# Patient Record
Sex: MALE | Race: BLACK OR AFRICAN AMERICAN | Employment: FULL TIME | URBAN - METROPOLITAN AREA
[De-identification: names, ages, dates, MRNs, and addresses within clinical notes are randomized per-mention and may not be internally consistent; named-entity substitution may affect disease eponyms.]

---

## 2019-04-20 ENCOUNTER — HOSPITAL ENCOUNTER (EMERGENCY)
Age: 49
Discharge: HOME | End: 2019-04-20
Attending: EMERGENCY MEDICINE

## 2019-04-20 ENCOUNTER — APPOINTMENT (OUTPATIENT)
Dept: CT IMAGING | Age: 49
End: 2019-04-20

## 2019-04-20 ENCOUNTER — APPOINTMENT (OUTPATIENT)
Dept: GENERAL RADIOLOGY | Age: 49
End: 2019-04-20

## 2019-04-20 VITALS
SYSTOLIC BLOOD PRESSURE: 154 MMHG | TEMPERATURE: 97.5 F | DIASTOLIC BLOOD PRESSURE: 87 MMHG | HEART RATE: 88 BPM | RESPIRATION RATE: 19 BRPM | OXYGEN SATURATION: 97 %

## 2019-04-20 DIAGNOSIS — I10 HYPERTENSION, UNSPECIFIED TYPE: ICD-10-CM

## 2019-04-20 DIAGNOSIS — R56.9 SEIZURE (HCC): Primary | ICD-10-CM

## 2019-04-20 LAB
ALBUMIN SERPL-MCNC: 4.8 G/DL (ref 3.4–5)
ALP BLD-CCNC: 76 U/L (ref 40–129)
ALT SERPL-CCNC: 18 U/L (ref 10–40)
AMPHETAMINE SCREEN, URINE: ABNORMAL
ANION GAP SERPL CALCULATED.3IONS-SCNC: 28 MMOL/L (ref 3–16)
AST SERPL-CCNC: 30 U/L (ref 15–37)
BARBITURATE SCREEN URINE: ABNORMAL
BASE EXCESS VENOUS: -16 (ref -3–3)
BASE EXCESS VENOUS: -5 (ref -3–3)
BASOPHILS ABSOLUTE: 0.1 K/UL (ref 0–0.2)
BASOPHILS RELATIVE PERCENT: 0.6 %
BENZODIAZEPINE SCREEN, URINE: ABNORMAL
BILIRUB SERPL-MCNC: 0.3 MG/DL (ref 0–1)
BILIRUBIN DIRECT: <0.2 MG/DL (ref 0–0.3)
BILIRUBIN, INDIRECT: NORMAL MG/DL (ref 0–1)
BUN BLDV-MCNC: 15 MG/DL (ref 7–20)
CALCIUM SERPL-MCNC: 10 MG/DL (ref 8.3–10.6)
CANNABINOID SCREEN URINE: POSITIVE
CHLORIDE BLD-SCNC: 95 MMOL/L (ref 99–110)
CO2: 15 MMOL/L (ref 21–32)
COCAINE METABOLITE SCREEN URINE: ABNORMAL
CREAT SERPL-MCNC: 0.9 MG/DL (ref 0.9–1.3)
EOSINOPHILS ABSOLUTE: 0.2 K/UL (ref 0–0.6)
EOSINOPHILS RELATIVE PERCENT: 1.7 %
ETHANOL: 47 MG/DL (ref 0–0.08)
GFR AFRICAN AMERICAN: >60
GFR NON-AFRICAN AMERICAN: >60
GLUCOSE BLD-MCNC: 97 MG/DL (ref 70–99)
HCO3 VENOUS: 12.6 MMOL/L (ref 23–29)
HCO3 VENOUS: 20.4 MMOL/L (ref 23–29)
HCT VFR BLD CALC: 47 % (ref 40.5–52.5)
HEMOGLOBIN: 15.1 G/DL (ref 13.5–17.5)
LACTATE: 15.83 MMOL/L (ref 0.4–2)
LACTATE: 6.81 MMOL/L (ref 0.4–2)
LIPASE: 20 U/L (ref 13–60)
LYMPHOCYTES ABSOLUTE: 3.3 K/UL (ref 1–5.1)
LYMPHOCYTES RELATIVE PERCENT: 25 %
Lab: ABNORMAL
MAGNESIUM: 2.4 MG/DL (ref 1.8–2.4)
MCH RBC QN AUTO: 33.4 PG (ref 26–34)
MCHC RBC AUTO-ENTMCNC: 32.1 G/DL (ref 31–36)
MCV RBC AUTO: 104.2 FL (ref 80–100)
METHADONE SCREEN, URINE: ABNORMAL
MONOCYTES ABSOLUTE: 0.5 K/UL (ref 0–1.3)
MONOCYTES RELATIVE PERCENT: 4.1 %
NEUTROPHILS ABSOLUTE: 9 K/UL (ref 1.7–7.7)
NEUTROPHILS RELATIVE PERCENT: 68.6 %
O2 SAT, VEN: 69 %
O2 SAT, VEN: 82 %
OPIATE SCREEN URINE: ABNORMAL
OXYCODONE URINE: ABNORMAL
PCO2, VEN: 34.2 MM HG (ref 40–50)
PCO2, VEN: 35.5 MM HG (ref 40–50)
PDW BLD-RTO: 14.3 % (ref 12.4–15.4)
PERFORMED ON: ABNORMAL
PERFORMED ON: ABNORMAL
PH UA: 5
PH VENOUS: 7.18 (ref 7.35–7.45)
PH VENOUS: 7.37 (ref 7.35–7.45)
PHENCYCLIDINE SCREEN URINE: ABNORMAL
PHOSPHORUS: 2.5 MG/DL (ref 2.5–4.9)
PLATELET # BLD: 243 K/UL (ref 135–450)
PMV BLD AUTO: 8.6 FL (ref 5–10.5)
PO2, VEN: 44 MM HG
PO2, VEN: 47 MM HG
POC SAMPLE TYPE: ABNORMAL
POC SAMPLE TYPE: ABNORMAL
POTASSIUM SERPL-SCNC: 3.8 MMOL/L (ref 3.5–5.1)
PROPOXYPHENE SCREEN: ABNORMAL
RBC # BLD: 4.51 M/UL (ref 4.2–5.9)
SODIUM BLD-SCNC: 138 MMOL/L (ref 136–145)
TCO2 CALC VENOUS: 14 MMOL/L
TCO2 CALC VENOUS: 21 MMOL/L
TOTAL PROTEIN: 7.9 G/DL (ref 6.4–8.2)
TROPONIN: <0.01 NG/ML
WBC # BLD: 13.1 K/UL (ref 4–11)

## 2019-04-20 PROCEDURE — G0480 DRUG TEST DEF 1-7 CLASSES: HCPCS

## 2019-04-20 PROCEDURE — 71046 X-RAY EXAM CHEST 2 VIEWS: CPT

## 2019-04-20 PROCEDURE — 85025 COMPLETE CBC W/AUTO DIFF WBC: CPT

## 2019-04-20 PROCEDURE — 83605 ASSAY OF LACTIC ACID: CPT

## 2019-04-20 PROCEDURE — 80307 DRUG TEST PRSMV CHEM ANLYZR: CPT

## 2019-04-20 PROCEDURE — 84484 ASSAY OF TROPONIN QUANT: CPT

## 2019-04-20 PROCEDURE — 96365 THER/PROPH/DIAG IV INF INIT: CPT

## 2019-04-20 PROCEDURE — 93005 ELECTROCARDIOGRAM TRACING: CPT | Performed by: EMERGENCY MEDICINE

## 2019-04-20 PROCEDURE — 82803 BLOOD GASES ANY COMBINATION: CPT

## 2019-04-20 PROCEDURE — 99285 EMERGENCY DEPT VISIT HI MDM: CPT

## 2019-04-20 PROCEDURE — 2580000003 HC RX 258: Performed by: EMERGENCY MEDICINE

## 2019-04-20 PROCEDURE — 70450 CT HEAD/BRAIN W/O DYE: CPT

## 2019-04-20 PROCEDURE — 83735 ASSAY OF MAGNESIUM: CPT

## 2019-04-20 PROCEDURE — 84100 ASSAY OF PHOSPHORUS: CPT

## 2019-04-20 PROCEDURE — 80076 HEPATIC FUNCTION PANEL: CPT

## 2019-04-20 PROCEDURE — 6370000000 HC RX 637 (ALT 250 FOR IP): Performed by: EMERGENCY MEDICINE

## 2019-04-20 PROCEDURE — 96366 THER/PROPH/DIAG IV INF ADDON: CPT

## 2019-04-20 PROCEDURE — 80048 BASIC METABOLIC PNL TOTAL CA: CPT

## 2019-04-20 PROCEDURE — 83690 ASSAY OF LIPASE: CPT

## 2019-04-20 RX ORDER — SODIUM CHLORIDE, SODIUM LACTATE, POTASSIUM CHLORIDE, CALCIUM CHLORIDE 600; 310; 30; 20 MG/100ML; MG/100ML; MG/100ML; MG/100ML
1000 INJECTION, SOLUTION INTRAVENOUS ONCE
Status: COMPLETED | OUTPATIENT
Start: 2019-04-20 | End: 2019-04-20

## 2019-04-20 RX ORDER — LORAZEPAM 1 MG/1
1 TABLET ORAL ONCE
Status: COMPLETED | OUTPATIENT
Start: 2019-04-20 | End: 2019-04-20

## 2019-04-20 RX ORDER — LORAZEPAM 1 MG/1
1 TABLET ORAL 2 TIMES DAILY
Qty: 6 TABLET | Refills: 0 | Status: SHIPPED | OUTPATIENT
Start: 2019-04-20 | End: 2019-04-23

## 2019-04-20 RX ADMIN — SODIUM CHLORIDE, POTASSIUM CHLORIDE, SODIUM LACTATE AND CALCIUM CHLORIDE 1000 ML: 600; 310; 30; 20 INJECTION, SOLUTION INTRAVENOUS at 19:19

## 2019-04-20 RX ADMIN — LORAZEPAM 1 MG: 1 TABLET ORAL at 20:24

## 2019-04-20 NOTE — ED PROVIDER NOTES
Ethanol   Result Value Ref Range    Ethanol Lvl 47 mg/dL   Urine Drug Screen   Result Value Ref Range    Amphetamine Screen, Urine Neg Negative <1000ng/mL    Barbiturate Screen, Ur Neg Negative <200 ng/mL    Benzodiazepine Screen, Urine Neg Negative <200 ng/mL    Cannabinoid Scrn, Ur POSITIVE (A) Negative <50 ng/mL    Cocaine Metabolite Screen, Urine Neg Negative <300 ng/mL    Opiate Scrn, Ur Neg Negative <300 ng/mL    PCP Screen, Urine Neg Negative <25 ng/mL    Methadone Screen, Urine Neg Negative <300 ng/mL    Propoxyphene Scrn, Ur Neg Negative <300 ng/mL    pH, UA 5.0     Drug Screen Comment: see below     Oxycodone Urine Neg Negative <100 ng/ml   POCT Venous   Result Value Ref Range    pH, [place] 7.176 (LL) 7.350 - 7.450    pCO2, [place] 34.2 (L) 40.0 - 50.0 mm Hg    pO2, [place] 44 Not Established mm Hg    HCO3, Venous 12.6 (L) 23.0 - 29.0 mmol/L    Base Excess, [place] -16 (L) -3 - 3    O2 Sat, [place] 69 Not Established %    TC02 (Calc), [place] 14 Not Established mmol/L    Lactate 15.83 (HH) 0.40 - 2.00 mmol/L    Sample Type AKOSUA     Performed on SEE BELOW        RECENT VITALS:  BP: (!) 177/110, Temp: 97.5 °F (36.4 °C), Pulse: 122, Resp: 21, SpO2: 92 %       ED Course     Nursing Notes, Past Medical Hx, Past Surgical Hx, Social Hx, Allergies, and Family Hx were reviewed. The patient was given the following medications:  Orders Placed This Encounter   Medications    lactated ringers infusion 1,000 mL    LORazepam (ATIVAN) tablet 1 mg    LORazepam (ATIVAN) 1 MG tablet     Sig: Take 1 tablet by mouth 2 times daily for 3 days. Dispense:  6 tablet     Refill:  0       CONSULTS:  None    MEDICAL DECISION MAKING / ASSESSMENT / Savana Francisco is a 46 y.o. male pursuing to the emergency department after an apparent new onset seizure.   The patient did not bite his tongue or lose control of bowel or bladder, but has a reliable witness, who seemed to a witness, proximally 30-60 seconds of generalized tonic-clonic activity times daily for 3 days.        DISPOSITION Decision To Discharge 04/20/2019 08:18:27 PM         Lea Castañeda MD  04/20/19 6623

## 2019-04-20 NOTE — ED NOTES
Bed: A09-09  Expected date:   Expected time:   Means of arrival:   Comments:  [address] [facility], RN  04/20/19 2060

## 2019-04-21 LAB
EKG ATRIAL RATE: 93 BPM
EKG DIAGNOSIS: NORMAL
EKG P AXIS: 80 DEGREES
EKG P-R INTERVAL: 134 MS
EKG Q-T INTERVAL: 356 MS
EKG QRS DURATION: 74 MS
EKG QTC CALCULATION (BAZETT): 442 MS
EKG R AXIS: -57 DEGREES
EKG T AXIS: 80 DEGREES
EKG VENTRICULAR RATE: 93 BPM

## 2023-02-17 ENCOUNTER — HOSPITAL ENCOUNTER (INPATIENT)
Age: 53
LOS: 5 days | Discharge: HOME | DRG: 180 | End: 2023-02-22
Attending: EMERGENCY MEDICINE | Admitting: INTERNAL MEDICINE
Payer: MEDICAID

## 2023-02-17 ENCOUNTER — APPOINTMENT (OUTPATIENT)
Dept: CT IMAGING | Age: 53
DRG: 180 | End: 2023-02-17
Payer: MEDICAID

## 2023-02-17 ENCOUNTER — APPOINTMENT (OUTPATIENT)
Dept: GENERAL RADIOLOGY | Age: 53
DRG: 180 | End: 2023-02-17
Payer: MEDICAID

## 2023-02-17 DIAGNOSIS — R91.8 LUNG MASS: ICD-10-CM

## 2023-02-17 DIAGNOSIS — C34.91 MALIGNANT NEOPLASM OF RIGHT LUNG, UNSPECIFIED PART OF LUNG (HCC): Primary | ICD-10-CM

## 2023-02-17 LAB
ANION GAP SERPL CALCULATED.3IONS-SCNC: 11 MMOL/L (ref 3–16)
BASOPHILS ABSOLUTE: 0.1 K/UL (ref 0–0.2)
BASOPHILS RELATIVE PERCENT: 1.2 %
BUN BLDV-MCNC: 8 MG/DL (ref 7–20)
CALCIUM SERPL-MCNC: 9.6 MG/DL (ref 8.3–10.6)
CHLORIDE BLD-SCNC: 98 MMOL/L (ref 99–110)
CO2: 23 MMOL/L (ref 21–32)
CREAT SERPL-MCNC: 0.5 MG/DL (ref 0.9–1.3)
EOSINOPHILS ABSOLUTE: 0.1 K/UL (ref 0–0.6)
EOSINOPHILS RELATIVE PERCENT: 0.8 %
GFR SERPL CREATININE-BSD FRML MDRD: >60 ML/MIN/{1.73_M2}
GLUCOSE BLD-MCNC: 105 MG/DL
GLUCOSE BLD-MCNC: 105 MG/DL (ref 70–99)
GLUCOSE BLD-MCNC: 166 MG/DL (ref 70–99)
HCT VFR BLD CALC: 31.6 % (ref 40.5–52.5)
HEMOGLOBIN: 10.4 G/DL (ref 13.5–17.5)
LYMPHOCYTES ABSOLUTE: 1.3 K/UL (ref 1–5.1)
LYMPHOCYTES RELATIVE PERCENT: 13.6 %
MCH RBC QN AUTO: 29.1 PG (ref 26–34)
MCHC RBC AUTO-ENTMCNC: 32.9 G/DL (ref 31–36)
MCV RBC AUTO: 88.5 FL (ref 80–100)
MONOCYTES ABSOLUTE: 0.7 K/UL (ref 0–1.3)
MONOCYTES RELATIVE PERCENT: 7.6 %
NEUTROPHILS ABSOLUTE: 7.2 K/UL (ref 1.7–7.7)
NEUTROPHILS RELATIVE PERCENT: 76.8 %
PDW BLD-RTO: 15.8 % (ref 12.4–15.4)
PERFORMED ON: ABNORMAL
PLATELET # BLD: 428 K/UL (ref 135–450)
PMV BLD AUTO: 7 FL (ref 5–10.5)
POTASSIUM REFLEX MAGNESIUM: 4.2 MMOL/L (ref 3.5–5.1)
PRO-BNP: 427 PG/ML (ref 0–124)
RBC # BLD: 3.57 M/UL (ref 4.2–5.9)
SEDIMENTATION RATE, ERYTHROCYTE: 102 MM/HR (ref 0–20)
SODIUM BLD-SCNC: 132 MMOL/L (ref 136–145)
TROPONIN: <0.01 NG/ML
WBC # BLD: 9.3 K/UL (ref 4–11)

## 2023-02-17 PROCEDURE — 85025 COMPLETE CBC W/AUTO DIFF WBC: CPT

## 2023-02-17 PROCEDURE — 6360000004 HC RX CONTRAST MEDICATION: Performed by: EMERGENCY MEDICINE

## 2023-02-17 PROCEDURE — 85652 RBC SED RATE AUTOMATED: CPT

## 2023-02-17 PROCEDURE — 74177 CT ABD & PELVIS W/CONTRAST: CPT

## 2023-02-17 PROCEDURE — 71260 CT THORAX DX C+: CPT

## 2023-02-17 PROCEDURE — 96360 HYDRATION IV INFUSION INIT: CPT

## 2023-02-17 PROCEDURE — 80048 BASIC METABOLIC PNL TOTAL CA: CPT

## 2023-02-17 PROCEDURE — 71046 X-RAY EXAM CHEST 2 VIEWS: CPT

## 2023-02-17 PROCEDURE — 84484 ASSAY OF TROPONIN QUANT: CPT

## 2023-02-17 PROCEDURE — 83880 ASSAY OF NATRIURETIC PEPTIDE: CPT

## 2023-02-17 PROCEDURE — 99285 EMERGENCY DEPT VISIT HI MDM: CPT

## 2023-02-17 PROCEDURE — 86140 C-REACTIVE PROTEIN: CPT

## 2023-02-17 PROCEDURE — 2060000000 HC ICU INTERMEDIATE R&B

## 2023-02-17 PROCEDURE — 93005 ELECTROCARDIOGRAM TRACING: CPT

## 2023-02-17 PROCEDURE — 2580000003 HC RX 258

## 2023-02-17 RX ORDER — SODIUM CHLORIDE 9 MG/ML
INJECTION, SOLUTION INTRAVENOUS PRN
Status: DISCONTINUED | OUTPATIENT
Start: 2023-02-17 | End: 2023-02-22 | Stop reason: HOSPADM

## 2023-02-17 RX ORDER — SODIUM CHLORIDE 0.9 % (FLUSH) 0.9 %
5-40 SYRINGE (ML) INJECTION PRN
Status: DISCONTINUED | OUTPATIENT
Start: 2023-02-17 | End: 2023-02-22 | Stop reason: HOSPADM

## 2023-02-17 RX ORDER — ONDANSETRON 4 MG/1
4 TABLET, ORALLY DISINTEGRATING ORAL EVERY 8 HOURS PRN
Status: DISCONTINUED | OUTPATIENT
Start: 2023-02-17 | End: 2023-02-22 | Stop reason: HOSPADM

## 2023-02-17 RX ORDER — GAUZE BANDAGE 2" X 2"
100 BANDAGE TOPICAL DAILY
Status: DISPENSED | OUTPATIENT
Start: 2023-02-18 | End: 2023-02-21

## 2023-02-17 RX ORDER — HYDRALAZINE HYDROCHLORIDE 20 MG/ML
5 INJECTION INTRAMUSCULAR; INTRAVENOUS EVERY 4 HOURS PRN
Status: DISCONTINUED | OUTPATIENT
Start: 2023-02-17 | End: 2023-02-22 | Stop reason: HOSPADM

## 2023-02-17 RX ORDER — NICOTINE 21 MG/24HR
1 PATCH, TRANSDERMAL 24 HOURS TRANSDERMAL DAILY
Status: DISCONTINUED | OUTPATIENT
Start: 2023-02-18 | End: 2023-02-22 | Stop reason: HOSPADM

## 2023-02-17 RX ORDER — ACETAMINOPHEN 650 MG/1
650 SUPPOSITORY RECTAL EVERY 6 HOURS PRN
Status: DISCONTINUED | OUTPATIENT
Start: 2023-02-17 | End: 2023-02-22 | Stop reason: HOSPADM

## 2023-02-17 RX ORDER — POLYETHYLENE GLYCOL 3350 17 G/17G
17 POWDER, FOR SOLUTION ORAL DAILY PRN
Status: DISCONTINUED | OUTPATIENT
Start: 2023-02-17 | End: 2023-02-22 | Stop reason: HOSPADM

## 2023-02-17 RX ORDER — SODIUM CHLORIDE 9 MG/ML
1000 INJECTION, SOLUTION INTRAVENOUS CONTINUOUS
Status: DISCONTINUED | OUTPATIENT
Start: 2023-02-17 | End: 2023-02-18

## 2023-02-17 RX ORDER — ONDANSETRON 2 MG/ML
4 INJECTION INTRAMUSCULAR; INTRAVENOUS EVERY 6 HOURS PRN
Status: DISCONTINUED | OUTPATIENT
Start: 2023-02-17 | End: 2023-02-22 | Stop reason: HOSPADM

## 2023-02-17 RX ORDER — SODIUM CHLORIDE 0.9 % (FLUSH) 0.9 %
5-40 SYRINGE (ML) INJECTION EVERY 12 HOURS SCHEDULED
Status: DISCONTINUED | OUTPATIENT
Start: 2023-02-18 | End: 2023-02-22 | Stop reason: HOSPADM

## 2023-02-17 RX ORDER — ACETAMINOPHEN 325 MG/1
650 TABLET ORAL EVERY 6 HOURS PRN
Status: DISCONTINUED | OUTPATIENT
Start: 2023-02-17 | End: 2023-02-22 | Stop reason: HOSPADM

## 2023-02-17 RX ORDER — LABETALOL HYDROCHLORIDE 5 MG/ML
5 INJECTION, SOLUTION INTRAVENOUS EVERY 4 HOURS PRN
Status: DISCONTINUED | OUTPATIENT
Start: 2023-02-17 | End: 2023-02-22 | Stop reason: HOSPADM

## 2023-02-17 RX ORDER — 0.9 % SODIUM CHLORIDE 0.9 %
500 INTRAVENOUS SOLUTION INTRAVENOUS ONCE
Status: COMPLETED | OUTPATIENT
Start: 2023-02-17 | End: 2023-02-17

## 2023-02-17 RX ORDER — ENOXAPARIN SODIUM 100 MG/ML
40 INJECTION SUBCUTANEOUS NIGHTLY
Status: DISCONTINUED | OUTPATIENT
Start: 2023-02-18 | End: 2023-02-18

## 2023-02-17 RX ORDER — GLUCAGON 1 MG/ML
1 KIT INJECTION PRN
Status: DISCONTINUED | OUTPATIENT
Start: 2023-02-17 | End: 2023-02-22 | Stop reason: HOSPADM

## 2023-02-17 RX ORDER — DEXTROSE MONOHYDRATE 100 MG/ML
INJECTION, SOLUTION INTRAVENOUS CONTINUOUS PRN
Status: DISCONTINUED | OUTPATIENT
Start: 2023-02-17 | End: 2023-02-22 | Stop reason: HOSPADM

## 2023-02-17 RX ADMIN — SODIUM CHLORIDE 500 ML: 9 INJECTION, SOLUTION INTRAVENOUS at 20:05

## 2023-02-17 RX ADMIN — IOPAMIDOL 75 ML: 755 INJECTION, SOLUTION INTRAVENOUS at 19:35

## 2023-02-17 RX ADMIN — SODIUM CHLORIDE 1000 ML: 9 INJECTION, SOLUTION INTRAVENOUS at 20:05

## 2023-02-17 ASSESSMENT — ENCOUNTER SYMPTOMS
EYE ITCHING: 0
TROUBLE SWALLOWING: 1
BLOOD IN STOOL: 0
CHOKING: 0
VOMITING: 0
ANAL BLEEDING: 0
APNEA: 0
DIARRHEA: 0
NAUSEA: 0
CHEST TIGHTNESS: 0
ABDOMINAL DISTENTION: 0
EYE PAIN: 0
BACK PAIN: 0
COLOR CHANGE: 0
ABDOMINAL PAIN: 0
EYE DISCHARGE: 0
COUGH: 0
SHORTNESS OF BREATH: 0
CONSTIPATION: 0

## 2023-02-17 ASSESSMENT — PAIN DESCRIPTION - LOCATION: LOCATION: LEG

## 2023-02-17 ASSESSMENT — PAIN DESCRIPTION - ORIENTATION: ORIENTATION: RIGHT;LEFT

## 2023-02-17 ASSESSMENT — PAIN SCALES - GENERAL: PAINLEVEL_OUTOF10: 3

## 2023-02-17 ASSESSMENT — PAIN DESCRIPTION - DESCRIPTORS: DESCRIPTORS: ACHING;DULL

## 2023-02-17 ASSESSMENT — PAIN DESCRIPTION - FREQUENCY: FREQUENCY: CONTINUOUS

## 2023-02-17 ASSESSMENT — PAIN DESCRIPTION - PAIN TYPE: TYPE: ACUTE PAIN

## 2023-02-17 NOTE — ED PROVIDER NOTES
ED Attending Attestation Note     Date of evaluation: 2/17/2023    This patient was seen by the resident. I have seen and examined the patient, agree with the workup, evaluation, management and diagnosis. The care plan has been discussed. I have reviewed the ECG and concur with the resident's interpretation. My assessment reveals a 49-year-old male who presents with a chief complaint of leg swelling. Patient with leg swelling times a few weeks. Denies shortness of breath. Long smoking history. Cachectic, does have some expiratory wheezing on exam.  Bilateral lower extremity edema. Ova MD Edwar  02/17/23 1950

## 2023-02-17 NOTE — ED PROVIDER NOTES
[address] [facility] RESIDENT NOTE       Date of evaluation: 2/17/2023    Chief Complaint     Leg Swelling    History of Present Illness     Bassam Cervantes is a 45 y.o. male with chronic tobacco use chronic alcohol use and not on any medications who presented with complaints of swollen joints. Patient endorses that since about early Dec 2022, he noticed that he had swelling of knees down to distal part of legs. He endorses that at the time he obtained over-the-counter diuretics which helped improve his leg swelling within 2 weeks. He endorses that in mid January he noticed that his knees had again swollen up however this time he also noticed that there was swelling in his bilateral wrists, hands, elbows. He tried over-the-counter diuretics again however with little relief. He comes into the ED today because of worsening swelling, he is using a wheelchair today as he is not able to walk. Patient endorses night sweats which started at the beginning of the year, he also endorses weight loss of 5 to 7 pounds within the same timeframe, endorsing that he has not had as strong as an appetite as he used to. He does note some new \"bumps and lumps\" behind his throat which he says he can feel while swallowing. He endorses he has been extremely fatigued and unable to complete daily activities of living. He denies any specific fevers or chills, nausea or vomiting, abdominal pain, acute urinary or bowel abnormalities. He denies chest pain, SOB, new rashes or bruising. He is a chronic smoker, smoked 1.5 packs/year since the age of 13. He he also has significant alcohol use history. Per his sister patient's drink of choice is beer, and on weekends he drinks beer until he \"passes out\". He works as a . He recently lost his job and was attempting to apply for a new one.   His family history is significant for brain cancer in paternal aunt, high blood pressure and heart disease in siblings. He does have a daughter whom he does not actively speak to, as well as grandchildren that he is not in contact with. Of note he has minimal long-term follow-up based on the chart. He was seen in our ED in 2019 for seizures (questionably alcohol withdrawal related) and high blood pressure. He was given Ativan and referred to Neurology, PCP for further evaluation on discharge from ED. There are no documented visits to Neurology in our system or [facility]where. The last time patient saw PCP was documented to be in 2014, he was diagnosed with hypertension and placed on Lis-HCTZ which he does not currently take. In the ED, vitals significant for high blood pressure to 332 systolic. He was tachycardic to 101. Other vital signs stable. Labs show hyponatremia to 132, chloride 98. CBC shows stable anemia, hemoglobin 10.4. Sed rate 102. CXR done in the ED shows large right upper lobe mass likely representing primary malignancy. CTAP with evidence  of large right upper lobe and right hilar mass consistent with primary lung cancer with possible metastatic disease in chest. Also mild subcarinal and pretracheal lymphadenopathy may represent metastatic disease. MEDICAL DECISION MAKING / ASSESSMENT / PLAN     INITIAL VITALS: BP: (!) 145/87, Temp: 98.3 °F (36.8 °C), Heart Rate: (!) 101, Resp: 18, SpO2: 99 %    Brigido Bach is a 45 y.o. male with past medical history of seizures, chronic tobacco and alcohol use who presented with complaints of joint swelling over the past few weeks that acutely worsened. Vital signs significant for mild hypertension, patient was seen in the past for high blood pressure but has been off medication. Labs show mild hyponatremia, chronic anemia and inflammatory markers are elevated. CXR revealing of mass concerning for malignancy. With patient's smoking history, this is concerning for primary lung cancer. CT abdomen pelvis was performed.   CT of the chest did not show any acute PE but did show evidence of large right upper lobe and right hilar mass consistent with primary lung cancer. The right upper lobe mass does encase right upper lobe pulmonary artery branches and bronchioles. 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 Decision was made to admit the patient to the floor for further work-up and management. Medical Decision Making  Amount and/or Complexity of Data Reviewed  Labs: ordered. Decision-making details documented in ED Course. Radiology: ordered. Decision-making details documented in ED Course. ECG/medicine tests: ordered. Risk  Prescription drug management. Decision regarding hospitalization. This patient was also evaluated by the attending physician. All care plans werediscussed and agreed upon. Clinical Impression     No diagnosis found. Disposition     PATIENT REFERRED TO:  No follow-up provider specified. DISCHARGE MEDICATIONS:  New Prescriptions    No medications on file     DISPOSITION Admitted 02/17/2023 09:56:50 PM    Diagnostic Results and Other Data     RADIOLOGY:  CT CHEST PULMONARY EMBOLISM W CONTRAST   Final Result      Chest:   1. Large right upper lobe and right hilar mass consistent with primary lung cancer. 2.  Mild subcarinal and pretracheal lymphadenopathy may represent metastatic disease. 3.  Severe emphysema. 4.  No evidence of pulmonary embolism. Right upper lobe mass encases and narrows right upper lobe pulmonary artery branches as well as bronchioles. Abdomen and pelvis:   1. Small indeterminate left adrenal nodule. 2.  Otherwise, no evidence of metastatic disease in the abdomen and pelvis. 3.  Note that the study is limited due to paucity of intra-abdominal fat/cachexia. CT ABDOMEN PELVIS W IV CONTRAST Additional Contrast? Oral   Final Result      Chest:   1.   Large right upper lobe and right hilar mass consistent with primary lung cancer. 2.  Mild subcarinal and pretracheal lymphadenopathy may represent metastatic disease. 3.  Severe emphysema. 4.  No evidence of pulmonary embolism. Right upper lobe mass encases and narrows right upper lobe pulmonary artery branches as well as bronchioles. Abdomen and pelvis:   1. Small indeterminate left adrenal nodule. 2.  Otherwise, no evidence of metastatic disease in the abdomen and pelvis. 3.  Note that the study is limited due to paucity of intra-abdominal fat/cachexia. XR CHEST (2 VW)   Final Result      Large right upper lobe mass likely representing primary malignancy. Recommend CT chest or chest/abdomen/pelvis with contrast for further evaluation.            LABS:   Results for orders placed or performed during the hospital encounter of 02/17/23   BMP w/ Reflex to MG   Result Value Ref Range    Sodium 132 (L) 136 - 145 mmol/L    Potassium reflex Magnesium 4.2 3.5 - 5.1 mmol/L    Chloride 98 (L) 99 - 110 mmol/L    CO2 23 21 - 32 mmol/L    Anion Gap 11 3 - 16    Glucose 166 (H) 70 - 99 mg/dL    BUN 8 7 - 20 mg/dL    Creatinine 0.5 (L) 0.9 - 1.3 mg/dL    Est, Glom Filt Rate >60 >60    Calcium 9.6 8.3 - 10.6 mg/dL   CBC with Auto Differential   Result Value Ref Range    WBC 9.3 4.0 - 11.0 K/uL    RBC 3.57 (L) 4.20 - 5.90 M/uL    Hemoglobin 10.4 (L) 13.5 - 17.5 g/dL    Hematocrit 31.6 (L) 40.5 - 52.5 %    MCV 88.5 80.0 - 100.0 fL    MCH 29.1 26.0 - 34.0 pg    MCHC 32.9 31.0 - 36.0 g/dL    RDW 15.8 (H) 12.4 - 15.4 %    Platelets 081 408 - 611 K/uL    MPV 7.0 5.0 - 10.5 fL    Neutrophils % 76.8 %    Lymphocytes % 13.6 %    Monocytes % 7.6 %    Eosinophils % 0.8 %    Basophils % 1.2 %    Neutrophils Absolute 7.2 1.7 - 7.7 K/uL    Lymphocytes Absolute 1.3 1.0 - 5.1 K/uL    Monocytes Absolute 0.7 0.0 - 1.3 K/uL    Eosinophils Absolute 0.1 0.0 - 0.6 K/uL    Basophils Absolute 0.1 0.0 - 0.2 K/uL   Troponin   Result Value Ref Range    Troponin <0.01 <0.01 ng/mL   Brain Natriuretic Peptide   Result Value Ref Range    Pro- (H) 0 - 124 pg/mL   Sedimentation Rate   Result Value Ref Range    Sed Rate 102 (H) 0 - 20 mm/Hr     EKG   Interpreted in conjunction with emergencydepartment physician John Paul Valdivia MD.    ED BEDSIDE ULTRASOUND:  No results found. RECENT VITALS:  BP: (!) 157/92, Temp: 98.3 °F (36.8 °C), Heart Rate: (!) 101,Resp: 18, SpO2: 100 %     Procedures     None. ED Course     Nursing Notes, Past Medical Hx, Past Surgical Hx, Social Hx, Allergies, and Family Hx were reviewed. ED Course as of 02/17/23 2157 Fri Feb 17, 2023 1901 Patient seen and examined. New b/l knee and leg swelling, b/l hand, wrist and elbow swelling. +Night sweats, weight loss, ? LAD. [SV]   1902 Ordered basic labs, ESR/CRP, CTPA, CT abdomen & pelvis. [SV]   1910 BMP with hyponatremia to 132 and Cl 98. CBC largely unremarkable with exception of anemia to 10 which appears chronic. [SV]   1923 Sed Rate(!): 102 [SV]   2120 CT CHEST PULMONARY EMBOLISM W CONTRAST [SV]      ED Course User Index  [SV] Ximena Torres MD     The patient was given the following medications:  Orders Placed This Encounter   Medications    iopamidol (ISOVUE-370) 76 % injection 75 mL    0.9 % sodium chloride infusion    0.9 % sodium chloride bolus     CONSULTS:  IP CONSULT TO HOSPITALIST    Review of Systems     Review of Systems   Constitutional:  Positive for appetite change and unexpected weight change (Pt states he has lost 5-7 lbs since Dec 22.). + Night sweats. Cardiovascular:  Positive for leg swelling. Negative for chest pain and palpitations. Gastrointestinal:  Negative for abdominal pain, constipation, diarrhea, nausea and vomiting. Genitourinary:  Negative for dysuria. Musculoskeletal:  Positive for arthralgias, joint swelling and myalgias. Past Medical, Surgical, Family, and Social History     He has a past medical history of Hypertension.   He has no past surgical history on file. His family history is not on file. He reports that he has been smoking cigarettes. He has been smoking an average of .5 packs per day. He has never used smokeless tobacco. He reports current alcohol use. He reports that he does not use drugs. Medications     Previous Medications    No medications on file     Allergies     He has No Known Allergies. Physical Exam     INITIAL VITALS: BP: (!) 145/87, Temp: 98.3 °F (36.8 °C), Heart Rate: (!) 101, Resp: 18, SpO2: 99 %     Physical Exam  Constitutional:       Comments: Cachectic appearance. HENT:      Head: Normocephalic and atraumatic. Nose: Nose normal.      Mouth/Throat:      Mouth: Mucous membranes are dry. Pharynx: Oropharynx is clear. Eyes:      Extraocular Movements: Extraocular movements intact. Conjunctiva/sclera: Conjunctivae normal.   Cardiovascular:      Rate and Rhythm: Regular rhythm. Tachycardia present. Pulses: Normal pulses. Heart sounds: Normal heart sounds. Pulmonary:      Effort: Pulmonary effort is normal.      Breath sounds: No wheezing or rales. Comments: Diminished breath sounds throughout, UL more diminished than LL. Abdominal:      General: Bowel sounds are normal.      Palpations: Abdomen is soft. Musculoskeletal:         General: Swelling and tenderness present. Cervical back: Normal range of motion and neck supple. Comments: Swelling and warmth noted in b/l elbow, wrist and finger joints. Swelling and warmth in b/l knee joints. Limited ROM 2/2 pain and swelling. Trace edema in b/l LE. Skin:     General: Skin is warm and dry. Capillary Refill: Capillary refill takes less than 2 seconds. Neurological:      General: No focal deficit present. Mental Status: He is alert and oriented to person, place, and time. Mental status is at baseline. Psychiatric:         Mood and Affect: Mood normal.         Behavior: Behavior normal.         Thought Content:  Thought content normal.         Judgment: Judgment normal.       Lidia Contreras MD  Resident  02/17/23 2147       Lidia Contreras MD  Resident  02/17/23 2150 Filt Rate >60 >60    Calcium 9.3 8.3 - 10.6 mg/dL    Phosphorus 2.9 2.5 - 4.9 mg/dL    Albumin 2.6 (L) 3.4 - 5.0 g/dL   CBC with Auto Differential   Result Value Ref Range    WBC 9.8 4.0 - 11.0 K/uL    RBC 3.35 (L) 4.20 - 5.90 M/uL    Hemoglobin 9.4 (L) 13.5 - 17.5 g/dL    Hematocrit 28.8 (L) 40.5 - 52.5 %    MCV 86.1 80.0 - 100.0 fL    MCH 28.2 26.0 - 34.0 pg    MCHC 32.7 31.0 - 36.0 g/dL    RDW 15.7 (H) 12.4 - 15.4 %    Platelets 898 420 - 762 K/uL    MPV 7.2 5.0 - 10.5 fL    Neutrophils % 72.1 %    Lymphocytes % 17.2 %    Monocytes % 9.5 %    Eosinophils % 0.8 %    Basophils % 0.4 %    Neutrophils Absolute 7.1 1.7 - 7.7 K/uL    Lymphocytes Absolute 1.7 1.0 - 5.1 K/uL    Monocytes Absolute 0.9 0.0 - 1.3 K/uL    Eosinophils Absolute 0.1 0.0 - 0.6 K/uL    Basophils Absolute 0.0 0.0 - 0.2 K/uL   Magnesium   Result Value Ref Range    Magnesium 1.90 1.80 - 2.40 mg/dL   Renal Function Panel   Result Value Ref Range    Sodium 137 136 - 145 mmol/L    Potassium 4.1 3.5 - 5.1 mmol/L    Chloride 102 99 - 110 mmol/L    CO2 28 21 - 32 mmol/L    Anion Gap 7 3 - 16    Glucose 135 (H) 70 - 99 mg/dL    BUN 10 7 - 20 mg/dL    Creatinine 0.5 (L) 0.9 - 1.3 mg/dL    Est, Glom Filt Rate >60 >60    Calcium 9.5 8.3 - 10.6 mg/dL    Phosphorus 2.8 2.5 - 4.9 mg/dL    Albumin 2.8 (L) 3.4 - 5.0 g/dL   POCT glucose   Result Value Ref Range    Glucose 105 mg/dL   POCT Glucose   Result Value Ref Range    POC Glucose 105 (H) 70 - 99 mg/dl    Performed on ACCU-CHEK    POCT Glucose   Result Value Ref Range    POC Glucose 160 (H) 70 - 99 mg/dl    Performed on ACCU-CHEK    POCT Glucose   Result Value Ref Range    POC Glucose 95 70 - 99 mg/dl    Performed on ACCU-CPM [facility]    POCT Glucose   Result Value Ref Range    POC Glucose 99 70 - 99 mg/dl    Performed on ACCU-CPM [facility]xisK    POCT Glucose   Result Value Ref Range    POC Glucose 102 (H) 70 - 99 mg/dl    Performed on ACCU-CHEK    EKG 12 Lead   Result Value Ref Range    Ventricular Rate 94 BPM    Atrial Rate 94 BPM    P-R Interval 140 ms    QRS Duration 78 ms    Q-T Interval 348 ms    QTc Calculation (Bazett) 435 ms    P Axis 80 degrees    R Axis 79 degrees    T Axis 87 degrees    Diagnosis       EKG performed in ER and to be interpreted by ER physician. Confirmed by MD, ER ([id number]),  Louie Bess ([phone]) on 2/18/2023 7:15:43 AM   Echo Complete   Result Value Ref Range    Left Ventricular Ejection Fraction 58     LVEF MODALITY ECHO      EKG   Interpreted in conjunction with emergencydepartment physician Alexandrea Powell MD.    ED BEDSIDE ULTRASOUND:  No results found. RECENT VITALS:  BP: (!) 145/76, Temp: 98.4 °F (36.9 °C), Heart Rate: (!) 105,Resp: 20, SpO2: 99 %     Procedures     None. ED Course     Nursing Notes, Past Medical Hx, Past Surgical Hx, Social Hx, Allergies, and Family Hx were reviewed. ED Course as of 02/27/23 0122   Fri Feb 17, 2023 1901 Patient seen and examined. New b/l knee and leg swelling, b/l hand, wrist and elbow swelling. +Night sweats, weight loss, ? LAD. [SV]   1902 Ordered basic labs, ESR/CRP, CTPA, CT abdomen & pelvis. [SV]   1910 BMP with hyponatremia to 132 and Cl 98. CBC largely unremarkable with exception of anemia to 10 which appears chronic.   [SV]   1923 Sed Rate(!): 102 [SV]   2120 CT CHEST PULMONARY EMBOLISM W CONTRAST [SV]      ED Course User Index  [SV] Bianca Perez MD     The patient was given the following medications:  Orders Placed This Encounter   Medications    iopamidol (ISOVUE-370) 76 % injection 75 mL    DISCONTD: 0.9 % sodium chloride infusion    0.9 % sodium chloride bolus    DISCONTD: sodium chloride flush 0.9 % injection 5-40 mL    DISCONTD: sodium chloride flush 0.9 % injection 5-40 mL    DISCONTD: 0.9 % sodium chloride infusion    DISCONTD: enoxaparin (LOVENOX) injection 40 mg     Order Specific Question:   Indication of Use     Answer:   Prophylaxis-DVT/PE    DISCONTD: ondansetron (ZOFRAN-ODT) disintegrating tablet 4 mg    DISCONTD: ondansetron (Faisal Cuna) injection 4 mg    DISCONTD: polyethylene glycol (GLYCOLAX) packet 17 g    DISCONTD: acetaminophen (TYLENOL) tablet 650 mg    DISCONTD: acetaminophen (TYLENOL) suppository 650 mg    DISCONTD: sodium chloride flush 0.9 % injection 5-40 mL    DISCONTD: sodium chloride flush 0.9 % injection 5-40 mL    DISCONTD: 0.9 % sodium chloride infusion    thiamine mononitrate tablet 100 mg    DISCONTD: nicotine (NICODERM CQ) 14 MG/24HR 1 patch    DISCONTD: glucose chewable tablet 16 g    DISCONTD: dextrose bolus 10% 125 mL    DISCONTD: dextrose bolus 10% 250 mL    DISCONTD: glucagon injection 1 mg    DISCONTD: dextrose 10 % infusion    DISCONTD: labetalol (NORMODYNE;TRANDATE) injection 5 mg    DISCONTD: hydrALAZINE (APRESOLINE) injection 5 mg    DISCONTD: heparin (porcine) injection 5,000 Units    DISCONTD: amLODIPine (NORVASC) tablet 5 mg    DISCONTD: perflutren lipid microspheres (DEFINITY) injection 1.5 mL    DISCONTD: ibuprofen (ADVIL;MOTRIN) tablet 400 mg    DISCONTD: enoxaparin (LOVENOX) injection 40 mg     Order Specific Question:   Indication of Use     Answer:   Prophylaxis-DVT/PE    gadoteridol (PROHANCE) injection 12 mL    DISCONTD: LORazepam (ATIVAN) tablet 1 mg    DISCONTD: LORazepam (ATIVAN) injection 1 mg    DISCONTD: LORazepam (ATIVAN) tablet 2 mg    DISCONTD: LORazepam (ATIVAN) injection 2 mg    DISCONTD: LORazepam (ATIVAN) tablet 3 mg    DISCONTD: LORazepam (ATIVAN) injection 3 mg    DISCONTD: LORazepam (ATIVAN) tablet 4 mg    DISCONTD: LORazepam (ATIVAN) injection 4 mg    0.9 % sodium chloride infusion     For endoscopy    DISCONTD: sodium chloride flush 0.9 % injection 5-40 mL    DISCONTD: sodium chloride flush 0.9 % injection 5-40 mL    DISCONTD: 0.9 % sodium chloride infusion    DISCONTD: HYDROmorphone (DILAUDID) injection 0.25 mg    amLODIPine (NORVASC) 5 MG tablet     Sig: Take 1 tablet by mouth daily     Dispense:  30 tablet     Refill:  3     CONSULTS:  IP CONSULT TO HOSPITALIST  IP CONSULT TO PULMONOLOGY  IP CONSULT TO DIETITIAN  IP CONSULT TO SOCIAL WORK  IP CONSULT TO ONCOLOGY    Review of Systems     Review of Systems   Constitutional:  Positive for appetite change and unexpected weight change (Pt states he has lost 5-7 lbs since Dec 22.). + Night sweats. Cardiovascular:  Positive for leg swelling. Negative for chest pain and palpitations. Gastrointestinal:  Negative for abdominal pain, constipation, diarrhea, nausea and vomiting. Genitourinary:  Negative for dysuria. Musculoskeletal:  Positive for arthralgias, joint swelling and myalgias. Past Medical, Surgical, Family, and Social History     He has a past medical history of Hypertension. He has a past surgical history that includes bronchoscopy (N/A, 2/21/2023); bronchoscopy (N/A, 2/21/2023); and bronchoscopy (2/21/2023). His family history is not on file. He reports that he has been smoking cigarettes. He has been smoking an average of .5 packs per day. He has never used smokeless tobacco. He reports current alcohol use. He reports that he does not use drugs. Medications     Discharge Medication List as of 2/22/2023  2:37 PM        Allergies     He has No Known Allergies. Physical Exam     INITIAL VITALS: BP: (!) 145/87, Temp: 98.3 °F (36.8 °C), Heart Rate: (!) 101, Resp: 18, SpO2: 99 %     Physical Exam  Constitutional:       Comments: Cachectic appearance. HENT:      Head: Normocephalic and atraumatic. Nose: Nose normal.      Mouth/Throat:      Mouth: Mucous membranes are dry. Pharynx: Oropharynx is clear. Eyes:      Extraocular Movements: Extraocular movements intact. Conjunctiva/sclera: Conjunctivae normal.   Cardiovascular:      Rate and Rhythm: Regular rhythm. Tachycardia present. Pulses: Normal pulses. Heart sounds: Normal heart sounds. Pulmonary:      Effort: Pulmonary effort is normal.      Breath sounds: No wheezing or rales.       Comments: Diminished breath sounds throughout, UL more diminished than LL. Abdominal:      General: Bowel sounds are normal.      Palpations: Abdomen is soft. Musculoskeletal:         General: Swelling and tenderness present. Cervical back: Normal range of motion and neck supple. Comments: Swelling and warmth noted in b/l elbow, wrist and finger joints. Swelling and warmth in b/l knee joints. Limited ROM 2/2 pain and swelling. Trace edema in b/l LE. Skin:     General: Skin is warm and dry. Capillary Refill: Capillary refill takes less than 2 seconds. Neurological:      General: No focal deficit present. Mental Status: He is alert and oriented to person, place, and time. Mental status is at baseline. Psychiatric:         Mood and Affect: Mood normal.         Behavior: Behavior normal.         Thought Content:  Thought content normal.         Judgment: Judgment normal.       Joel Michael MD  Resident  02/17/23 5320       Joel Michael MD  Resident  02/17/23 4829       Joel Michael MD  Resident  02/27/23 0538

## 2023-02-18 LAB
ALBUMIN SERPL-MCNC: 2.7 G/DL (ref 3.4–5)
ALP BLD-CCNC: 155 U/L (ref 40–129)
ALT SERPL-CCNC: 30 U/L (ref 10–40)
AST SERPL-CCNC: 28 U/L (ref 15–37)
BILIRUB SERPL-MCNC: 0.3 MG/DL (ref 0–1)
BILIRUBIN DIRECT: <0.2 MG/DL (ref 0–0.3)
BILIRUBIN, INDIRECT: ABNORMAL MG/DL (ref 0–1)
C-REACTIVE PROTEIN: 80.6 MG/L (ref 0–5.1)
EKG ATRIAL RATE: 94 BPM
EKG DIAGNOSIS: NORMAL
EKG P AXIS: 80 DEGREES
EKG P-R INTERVAL: 140 MS
EKG Q-T INTERVAL: 348 MS
EKG QRS DURATION: 78 MS
EKG QTC CALCULATION (BAZETT): 435 MS
EKG R AXIS: 79 DEGREES
EKG T AXIS: 87 DEGREES
EKG VENTRICULAR RATE: 94 BPM
ETHANOL: NORMAL MG/DL (ref 0–0.08)
FERRITIN: 251.7 NG/ML (ref 30–400)
GLUCOSE BLD-MCNC: 160 MG/DL (ref 70–99)
INFLUENZA A: NOT DETECTED
INFLUENZA B: NOT DETECTED
PERFORMED ON: ABNORMAL
SARS-COV-2 RNA, RT PCR: NOT DETECTED
TOTAL PROTEIN: 6.3 G/DL (ref 6.4–8.2)

## 2023-02-18 PROCEDURE — 80076 HEPATIC FUNCTION PANEL: CPT

## 2023-02-18 PROCEDURE — 2580000003 HC RX 258: Performed by: STUDENT IN AN ORGANIZED HEALTH CARE EDUCATION/TRAINING PROGRAM

## 2023-02-18 PROCEDURE — 87636 SARSCOV2 & INF A&B AMP PRB: CPT

## 2023-02-18 PROCEDURE — 6370000000 HC RX 637 (ALT 250 FOR IP): Performed by: STUDENT IN AN ORGANIZED HEALTH CARE EDUCATION/TRAINING PROGRAM

## 2023-02-18 PROCEDURE — 99223 1ST HOSP IP/OBS HIGH 75: CPT | Performed by: INTERNAL MEDICINE

## 2023-02-18 PROCEDURE — 82746 ASSAY OF FOLIC ACID SERUM: CPT

## 2023-02-18 PROCEDURE — 2060000000 HC ICU INTERMEDIATE R&B

## 2023-02-18 PROCEDURE — 94761 N-INVAS EAR/PLS OXIMETRY MLT: CPT

## 2023-02-18 PROCEDURE — 83540 ASSAY OF IRON: CPT

## 2023-02-18 PROCEDURE — 82077 ASSAY SPEC XCP UR&BREATH IA: CPT

## 2023-02-18 PROCEDURE — 82607 VITAMIN B-12: CPT

## 2023-02-18 PROCEDURE — 2580000003 HC RX 258

## 2023-02-18 PROCEDURE — 83550 IRON BINDING TEST: CPT

## 2023-02-18 PROCEDURE — 6360000002 HC RX W HCPCS: Performed by: STUDENT IN AN ORGANIZED HEALTH CARE EDUCATION/TRAINING PROGRAM

## 2023-02-18 PROCEDURE — 36415 COLL VENOUS BLD VENIPUNCTURE: CPT

## 2023-02-18 PROCEDURE — 82728 ASSAY OF FERRITIN: CPT

## 2023-02-18 PROCEDURE — 84443 ASSAY THYROID STIM HORMONE: CPT

## 2023-02-18 RX ORDER — AMLODIPINE BESYLATE 5 MG/1
5 TABLET ORAL DAILY
Status: DISCONTINUED | OUTPATIENT
Start: 2023-02-18 | End: 2023-02-22 | Stop reason: HOSPADM

## 2023-02-18 RX ORDER — IBUPROFEN 200 MG
400 TABLET ORAL EVERY 8 HOURS PRN
Status: DISCONTINUED | OUTPATIENT
Start: 2023-02-18 | End: 2023-02-22 | Stop reason: HOSPADM

## 2023-02-18 RX ORDER — HEPARIN SODIUM 5000 [USP'U]/ML
5000 INJECTION, SOLUTION INTRAVENOUS; SUBCUTANEOUS EVERY 8 HOURS SCHEDULED
Status: DISCONTINUED | OUTPATIENT
Start: 2023-02-18 | End: 2023-02-19

## 2023-02-18 RX ADMIN — ENOXAPARIN SODIUM 40 MG: 100 INJECTION SUBCUTANEOUS at 02:12

## 2023-02-18 RX ADMIN — AMLODIPINE BESYLATE 5 MG: 5 TABLET ORAL at 17:26

## 2023-02-18 RX ADMIN — SODIUM CHLORIDE 1000 ML: 9 INJECTION, SOLUTION INTRAVENOUS at 05:29

## 2023-02-18 RX ADMIN — SODIUM CHLORIDE, PRESERVATIVE FREE 10 ML: 5 INJECTION INTRAVENOUS at 12:48

## 2023-02-18 RX ADMIN — SODIUM CHLORIDE, PRESERVATIVE FREE 10 ML: 5 INJECTION INTRAVENOUS at 20:56

## 2023-02-18 ASSESSMENT — ENCOUNTER SYMPTOMS
SHORTNESS OF BREATH: 0
WHEEZING: 0
COUGH: 1
CHEST TIGHTNESS: 0

## 2023-02-18 ASSESSMENT — PAIN SCALES - GENERAL
PAINLEVEL_OUTOF10: 0
PAINLEVEL_OUTOF10: 0

## 2023-02-18 NOTE — PROGRESS NOTES
Patient admitted into [phone] from ED. Vital signs stable, patient denies pain at this time. Bed alarm turned on for safety, patient educated on bed alarm and call light use. Denies needs at this time. Family at bedside and updated.

## 2023-02-18 NOTE — ED NOTES
Patient updated on plan of care. Gave food and drink. Gave drink to visitor. Denies other needs at this time.       Lizzie Alpers, RN  02/17/23 6446

## 2023-02-18 NOTE — CONSULTS
Pulmonary Consult    Patient's PCP: Pcp No  Referred by: Anni Bolanos MD for lung mass     HISTORY OF PRESENT ILLNESS:    This is a  45 y.o. male with PMH of listed below presented to the hospital with extremities swelling and pain. It started as lower extremities in Dec and progressed to upper extremities associated with wt loss and fatigue. He tried over the counter pain medications, it helped some but condition continue to progress. There is no SOB at this time. He has chronic cough. There is no hemoptysis. There is no fever or chills. He is a smoker. He smoked for 35+ years. He usually smoke 1.5 PPD. Past Medical / Surgical History:    Past Medical History:   Diagnosis Date    Hypertension      No past surgical history on file. Prior to Admission:    No current facility-administered medications on file prior to encounter. No current outpatient medications on file prior to encounter. Allergies:  Patient has no known allergies. Social History:   TOBACCO:   reports that he has been smoking cigarettes. He has been smoking an average of .5 packs per day. He has never used smokeless tobacco.     ETOH:   reports current alcohol use. Family History:   No family history on file. Review of Systems   Constitutional:  Positive for fatigue and unexpected weight change. Negative for chills and fever. HENT:  Negative for congestion. Respiratory:  Positive for cough. Negative for chest tightness, shortness of breath and wheezing. Cardiovascular:  Positive for leg swelling. Negative for chest pain and palpitations. Neurological:  Negative for weakness. Psychiatric/Behavioral:  The patient is not nervous/anxious. All other systems reviewed and are negative. PHYSICAL EXAM:  BP (!) 153/80   Pulse 98   Temp 99.3 °F (37.4 °C) (Oral)   Resp 17   Ht 6' 1\" (1.854 m)   Wt 126 lb (57.2 kg)   SpO2 98%   BMI 16.62 kg/m²     Physical Exam  Vitals reviewed. Constitutional:       Appearance: He is well-developed. He is ill-appearing. HENT:      Head: Normocephalic and atraumatic. Eyes:      Pupils: Pupils are equal, round, and reactive to light. Neck:      Vascular: No JVD. Cardiovascular:      Rate and Rhythm: Normal rate and regular rhythm. Heart sounds: No murmur heard. Pulmonary:      Effort: Pulmonary effort is normal. No respiratory distress. Breath sounds: Normal breath sounds. No stridor. No wheezing or rales. Abdominal:      General: Bowel sounds are normal.      Palpations: Abdomen is soft. Musculoskeletal:      Cervical back: Neck supple. Right lower leg: No edema. Left lower leg: Edema present. Comments: Clubbing of fingers   Skin:     General: Skin is warm and dry. Neurological:      Mental Status: He is alert and oriented to person, place, and time. Psychiatric:         Behavior: Behavior normal.       LABS:  Recent Labs     02/17/23  1834   WBC 9.3   HGB 10.4*   HCT 31.6*                                                                     Recent Labs     02/17/23 1834 02/17/23  2312   *  --    K 4.2  --    CL 98*  --    CO2 23  --    BUN 8  --    CREATININE 0.5*  --    GLUCOSE 166* 105     Recent Labs     02/18/23  1308   AST 28   ALT 30   BILITOT 0.3   ALKPHOS 155*     Recent Labs     02/17/23  1834   TROPONINI <0.01     No results for input(s): BNP in the last 72 hours. No results found for: PHART, JWG5QCD, PO2ART  No results for input(s): INR in the last 72 hours. No results for input(s): NITRITE, COLORU, PHUR, LABCAST, WBCUA, RBCUA, MUCUS, TRICHOMONAS, YEAST, BACTERIA, CLARITYU, SPECGRAV, LEUKOCYTESUR, UROBILINOGEN, BILIRUBINUR, BLOODU, GLUCOSEU, AMORPHOUS in the last 72 hours. Invalid input(s): KETONESU     DATA:  CT chest abdomen and pelvis   Chest:   1. Large right upper lobe and right hilar mass consistent with primary lung cancer.    2.  Mild subcarinal and pretracheal lymphadenopathy may represent metastatic disease. 3.  Severe emphysema. 4.  No evidence of pulmonary embolism. Right upper lobe mass encases and narrows right upper lobe pulmonary artery branches as well as bronchioles. Abdomen and pelvis:   1. Small indeterminate left adrenal nodule. 2.  Otherwise, no evidence of metastatic disease in the abdomen and pelvis. 3.  Note that the study is limited due to paucity of intra-abdominal fat/cachexia. 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 Plan for bronchoscopic and EBUS biopsy early next week  NSAIDS can help with pain. Started him on ibuprofen. The patient and / or the family were informed of the results of any tests, a time was given to answer questions, a plan was proposed and they agreed with plan. Thank you Pcp Brenda for the opportunity to be involved in this patients care.  If you have any questions or concerns please feel free to contact me  Full Code

## 2023-02-18 NOTE — ED NOTES
Report received from [facility]. Patient to Ct at this time.       Abiola Begum RN  02/17/23 0881

## 2023-02-18 NOTE — H&P
Internal Medicine  PGY 3  History & Physical      CC leg swelling    History Obtained From:  patient    HISTORY OF PRESENT ILLNESS:  49-year-old male with a past medical history of hypertension as well as tobacco and alcohol use presents to the ED with lower extremity swelling. The patient reports that for about 2 months he has noticed swelling up to his knees. He reports that he was able to acquire over-the-counter diuretics which seemed to help improve his leg swelling. The diuretic he acquired was diurex. He noticed that there was periodic swelling of his legs which would have some degree of response to diuretic however it was when he started noticing swelling in his wrists, hands and elbows when he decided to come to the ED. In the ED he had to be brought in via wheelchair. He does report a history of night sweats as well as unintentional weight loss of about 8 pounds in the last 2 and half months. He reports feeling less strong and active than he used to  And has had difficulty completing his activities of daily living without feeling fatigued. He denies any chest pain, shortness of breath, new rashes, nausea, vomiting, weight loss, facial congestion. He reports smoking about 1 to 1-1/2 pack  Since the age of 10. He reports occasionally consuming beer. He does not report any other drug use. He worked in [employer] most of his life. 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 On presentation to the ED his vitals were significant for tachycardia at 101 and hypertension at 150/90. CBC was significant for a hemoglobin of 10.4 which isless than his last hemoglobin of 15 from 2019. RFP was significant for hyponatremia at 132, hypochloremia 98. Chest x-ray demonstrated a large right upper lobe mass. CT chest abdomen pelvis with contrast demonstrated a large right upper lobe and right hilar mass suspicious for malignancy. Severe emphysema. No evidence of PE. There was no sign of any metastatic disease in the abdomen or pelvis. Past Medical History:        Diagnosis Date    Hypertension        Past Surgical History:    No past surgical history on file. Medications Priorto Admission:    Not in a hospital admission. Allergies:  Patient has no known allergies. Social History:   TOBACCO:   reports that he has been smoking cigarettes. He has been smoking an average of .5 packs per day. He has never used smokeless tobacco.  ETOH:   reports current alcohol use. Family History:   No family history on file. Review of Systems   Constitutional:  Positive for activity change, chills, fatigue and unexpected weight change. Negative for fever. HENT:  Positive for trouble swallowing. Negative for congestion, dental problem, drooling, ear discharge, ear pain and hearing loss. Eyes:  Negative for pain, discharge and itching. Respiratory:  Negative for apnea, cough, choking, chest tightness and shortness of breath. Cardiovascular:  Positive for leg swelling. Negative for chest pain and palpitations. Gastrointestinal:  Negative for abdominal distention, abdominal pain, anal bleeding and blood in stool. Endocrine: Negative for cold intolerance and polydipsia. Genitourinary:  Negative for difficulty urinating, dysuria, enuresis and flank pain. Musculoskeletal:  Positive for joint swelling. Negative for arthralgias and back pain. Skin:  Negative for color change, pallor and rash. Neurological:  Positive for weakness. Negative for tremors, syncope, speech difficulty and light-headedness. Psychiatric/Behavioral:  Negative for agitation, behavioral problems, confusion and decreased concentration. ROS: A 10 point review of systems was conducted, significant findings as noted in HPI. Physical Exam  Constitutional:       General: He is not in acute distress. Appearance: Normal appearance. He is not ill-appearing.    HENT: Head: Normocephalic. Right Ear: Tympanic membrane normal. There is no impacted cerumen. Nose: Nose normal.      Mouth/Throat:      Mouth: Mucous membranes are moist.   Eyes:      General:         Right eye: No discharge. Left eye: No discharge. Extraocular Movements: Extraocular movements intact. Pupils: Pupils are equal, round, and reactive to light. Cardiovascular:      Rate and Rhythm: Normal rate. Pulses: Normal pulses. Heart sounds: No murmur heard. No gallop. Pulmonary:      Effort: No respiratory distress. Breath sounds: No stridor. No wheezing or rhonchi. Chest:      Chest wall: No tenderness. Abdominal:      General: Bowel sounds are normal. There is no distension. Palpations: There is no mass. Tenderness: There is no abdominal tenderness. There is no guarding. Musculoskeletal:         General: No swelling. Right lower leg: Edema present. Left lower leg: Edema present. Skin:     Capillary Refill: Capillary refill takes less than 2 seconds. Coloration: Skin is not jaundiced. Findings: No bruising. Neurological:      Mental Status: He is alert and oriented to person, place, and time. Cranial Nerves: No cranial nerve deficit. Sensory: No sensory deficit. Motor: No weakness. Coordination: Coordination normal.      Deep Tendon Reflexes: Reflexes normal.   Psychiatric:         Mood and Affect: Mood normal.     Physical exam:       Vitals:    02/17/23 2200   BP: (!) 141/85   Pulse:    Resp:    Temp:    SpO2: 99%       DATA:    Labs:  CBC:   Recent Labs     02/17/23  1834   WBC 9.3   HGB 10.4*   HCT 31.6*          BMP:   Recent Labs     02/17/23  1834   *   K 4.2   CL 98*   CO2 23   BUN 8   CREATININE 0.5*   GLUCOSE 166*     LFT's: No results for input(s): AST, ALT, ALB, BILITOT, ALKPHOS in the last 72 hours.   Troponin:   Recent Labs     02/17/23  1834   TROPONINI <0.01     BNP:No results for input(s): BNP in the last 72 hours. ABGs: No results for input(s): PHART, MHR5MQP, PO2ART in the last 72 hours. INR: No results for input(s): INR in the last 72 hours. U/A:No results for input(s): NITRITE, COLORU, PHUR, LABCAST, WBCUA, RBCUA, MUCUS, TRICHOMONAS, YEAST, BACTERIA, CLARITYU, SPECGRAV, LEUKOCYTESUR, UROBILINOGEN, BILIRUBINUR, BLOODU, GLUCOSEU, AMORPHOUS in the last 72 hours. Invalid input(s): KETONESU    CT CHEST PULMONARY EMBOLISM W CONTRAST   Final Result      Chest:   1. Large right upper lobe and right hilar mass consistent with primary lung cancer. 2.  Mild subcarinal and pretracheal lymphadenopathy may represent metastatic disease. 3.  Severe emphysema. 4.  No evidence of pulmonary embolism. Right upper lobe mass encases and narrows right upper lobe pulmonary artery branches as well as bronchioles. Abdomen and pelvis:   1. Small indeterminate left adrenal nodule. 2.  Otherwise, no evidence of metastatic disease in the abdomen and pelvis. 3.  Note that the study is limited due to paucity of intra-abdominal fat/cachexia. CT ABDOMEN PELVIS W IV CONTRAST Additional Contrast? Oral   Final Result      Chest:   1. Large right upper lobe and right hilar mass consistent with primary lung cancer. 2.  Mild subcarinal and pretracheal lymphadenopathy may represent metastatic disease. 3.  Severe emphysema. 4.  No evidence of pulmonary embolism. Right upper lobe mass encases and narrows right upper lobe pulmonary artery branches as well as bronchioles. Abdomen and pelvis:   1. Small indeterminate left adrenal nodule. 2.  Otherwise, no evidence of metastatic disease in the abdomen and pelvis. 3.  Note that the study is limited due to paucity of intra-abdominal fat/cachexia. XR CHEST (2 VW)   Final Result      Large right upper lobe mass likely representing primary malignancy.  Recommend CT chest or chest/abdomen/pelvis with contrast for further evaluation. ASSESSMENT AND PLAN:    Right upper lobe lung mass  - Suspicious for malignancy given patient's weight loss and night sweats  - Extensive smoking history  - N.p.o. at midnight  - Pulmonology consult for possible bronchoscopy    Hypertension  - There is mention of the patient being on lisinopril-hydrochlorothiazide in 2014  -As needed labetalol and hydralazine  -The patient will need to be restarted on blood pressure medications however will hold lisinopril prior to possible procedures and will hold hydrochlorothiazide since he is hyponatremic and hypochloremia at this time.   - If blood pressure continues to be high consider Norvasc       Will discuss with attending physician Mallory Caceres    Code Status:Full code  FEN: NPO at MN  PPX: lovenox  DISPO: Armani Reynolds MD  2/17/2023,  10:56 PM

## 2023-02-18 NOTE — PROGRESS NOTES
Progress Note    Admit Date: 2/17/2023  Diet: ADULT DIET; Regular; 4 carb choices (60 gm/meal)    CC: leg swelling    Interval history: NAEON. Pt was seen at bedside today AM, resting comfortably. Does not have any new complaints. Continues to feel extremely fatigued that is limiting his activity. Endorses appetite. Voiding as usual.  Denies F/C, N/V, CP, SOB, HA, vision changes, weakness in arms and feet. HPI: 46 yo M with HTN, tobacco and aclohol use present to ED with lower extremity swelling since Dec 2022. Tried PTC diuretics which helped. Swelling progressed to his lower and upper extremity. 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 Hg 10.4 (down from 15 in 2019). CXR with large RT UL mass, CR chest abdomen pelvis with con demonstrated a large right upper lobe and right hilar mass suspicious for malignancy. Severe emphysema. No evidence of PE. There was no sign of any metastatic disease in the abdomen or pelvis.  Admitted for further workup of RUL mass    Medications:     Scheduled Meds:   heparin (porcine)  5,000 Units SubCUTAneous 3 times per day    amLODIPine  5 mg Oral Daily    sodium chloride flush  5-40 mL IntraVENous 2 times per day    sodium chloride flush  5-40 mL IntraVENous 2 times per day    thiamine  100 mg Oral Daily    nicotine  1 patch TransDERmal Daily     Continuous Infusions:   sodium chloride      sodium chloride      dextrose       PRN Meds:sodium chloride flush, sodium chloride, ondansetron **OR** ondansetron, polyethylene glycol, acetaminophen **OR** acetaminophen, sodium chloride flush, sodium chloride, glucose, dextrose bolus **OR** dextrose bolus, glucagon (rDNA), dextrose, labetalol, hydrALAZINE    Objective:   Vitals:   T-max:  No data found. Intake/Output Summary (Last 24 hours) at 2/18/2023 1157  Last data filed at 2/18/2023 3082  Gross per 24 hour   Intake 1830 ml   Output 275 ml   Net 1555 ml     Physical Exam  Constitutional:       General: He is not in acute distress. Appearance: Normal appearance. He is ill-appearing. He is not toxic-appearing or diaphoretic. Comments: Appears emaciated   Eyes:      Pupils: Pupils are equal, round, and reactive to light. Cardiovascular:      Rate and Rhythm: Normal rate and regular rhythm. Pulses: Normal pulses. Heart sounds: Normal heart sounds. No murmur heard. Pulmonary:      Effort: Pulmonary effort is normal. No respiratory distress. Breath sounds: Normal breath sounds. Abdominal:      General: Bowel sounds are normal. There is no distension. Palpations: Abdomen is soft. Tenderness: There is no abdominal tenderness. There is no guarding. Musculoskeletal:         General: Swelling present. Comments: Swelling in b/l upper extremities  Minimal swelling in lower extremities. However was tender when I palpated to check for swelling   Neurological:      Mental Status: He is alert. Mental status is at baseline. LABS:  CBC:   Recent Labs     02/17/23  1834   WBC 9.3   HGB 10.4*   HCT 31.6*      MCV 88.5     Renal:    Recent Labs     02/17/23  1834 02/17/23  2312   *  --    K 4.2  --    CL 98*  --    CO2 23  --    BUN 8  --    CREATININE 0.5*  --    GLUCOSE 166* 105   CALCIUM 9.6  --    ANIONGAP 11  --      Hepatic: No results for input(s): AST, ALT, BILITOT, BILIDIR, PROT, LABALBU, ALKPHOS in the last 72 hours. Troponin:   Recent Labs     02/17/23  1834   TROPONINI <0.01     BNP: No results for input(s): BNP in the last 72 hours. Lipids: No results for input(s): CHOL, HDL in the last 72 hours.     Invalid input(s): LDLCALCU, TRIGLYCERIDE  ABGs:  No results for input(s): PHART, WJJ2BNQ, PO2ART, LXN8KRT, BEART, THGBART, H3BPTAAI, LCV7BRA in the last 72 hours. INR: No results for input(s): INR in the last 72 hours. Lactate: No results for input(s): LACTATE in the last 72 hours. Cultures:  -----------------------------------------------------------------  RAD:   CT CHEST PULMONARY EMBOLISM W CONTRAST   Final Result      Chest:   1. Large right upper lobe and right hilar mass consistent with primary lung cancer. 2.  Mild subcarinal and pretracheal lymphadenopathy may represent metastatic disease. 3.  Severe emphysema. 4.  No evidence of pulmonary embolism. Right upper lobe mass encases and narrows right upper lobe pulmonary artery branches as well as bronchioles. Abdomen and pelvis:   1. Small indeterminate left adrenal nodule. 2.  Otherwise, no evidence of metastatic disease in the abdomen and pelvis. 3.  Note that the study is limited due to paucity of intra-abdominal fat/cachexia. CT ABDOMEN PELVIS W IV CONTRAST Additional Contrast? Oral   Final Result      Chest:   1. Large right upper lobe and right hilar mass consistent with primary lung cancer. 2.  Mild subcarinal and pretracheal lymphadenopathy may represent metastatic disease. 3.  Severe emphysema. 4.  No evidence of pulmonary embolism. Right upper lobe mass encases and narrows right upper lobe pulmonary artery branches as well as bronchioles. Abdomen and pelvis:   1. Small indeterminate left adrenal nodule. 2.  Otherwise, no evidence of metastatic disease in the abdomen and pelvis. 3.  Note that the study is limited due to paucity of intra-abdominal fat/cachexia. XR CHEST (2 VW)   Final Result      Large right upper lobe mass likely representing primary malignancy. Recommend CT chest or chest/abdomen/pelvis with contrast for further evaluation.            Assessment/Plan:     Right upper lobe lung mass  Tobacco use disorder  Unintentional weight loss, night sweats  Extreme fatigue  Peripheral edema  Anemia    - pending TSH, iron studies, vitamin b12/folate  - No previous cardiac workup, however pt does not complain of any orthopnea. Will discuss with attending about Echo to r/o cardiac causes of edema  - Pulmonology consulted for possible bronchoscopy  - PT/OT    Low BMI, likely from underlying malignancy  Dietitian consulted    Hypertension. Not on any meds  Elevated in hospital. Start norvasc 5mg QD     Will discuss with attending physician Viviana Paul     Code Status:Full code  FEN: ADULT DIET;  Regular; 4 carb choices (60 gm/meal)  PPX: Heparin TID  DISPO: Brodie Walsh MD, PGY-3  02/18/23  11:57 AM    This patient will be discussed with Dr Donald Scale     Discussed with resident about current care of plan  F/u pulmonary recommendation  Get ECHO

## 2023-02-18 NOTE — ED NOTES
ED TO INPATIENT SBAR HANDOFF    Patient Name: Ingrid Kaufman   :  1970  45 y.o. MRN:  [id number]  Preferred Name  Ingrid Kaufman   ED Room #:  Q37/T62-91  Family/Caregiver Present no   Restraints no   Sitter no   Sepsis Risk Score Sepsis Risk Score: 1.3    Situation  Code Status: Full Code No additional code details. Allergies: Patient has no known allergies. Weight: Patient Vitals for the past 96 hrs (Last 3 readings):   Weight   23 0005 126 lb (57.2 kg)   23 1330 126 lb (57.2 kg)     Arrived from: nursing home  Chief Complaint:   Chief Complaint   Patient presents with    [address] Problem/Diagnosis:  Principal Problem:    Lung mass  Resolved Problems:    * No resolved hospital problems. *    Imaging:   CT CHEST PULMONARY EMBOLISM W CONTRAST   Final Result      Chest:   1. Large right upper lobe and right hilar mass consistent with primary lung cancer. 2.  Mild subcarinal and pretracheal lymphadenopathy may represent metastatic disease. 3.  Severe emphysema. 4.  No evidence of pulmonary embolism. Right upper lobe mass encases and narrows right upper lobe pulmonary artery branches as well as bronchioles. Abdomen and pelvis:   1. Small indeterminate left adrenal nodule. 2.  Otherwise, no evidence of metastatic disease in the abdomen and pelvis. 3.  Note that the study is limited due to paucity of intra-abdominal fat/cachexia. CT ABDOMEN PELVIS W IV CONTRAST Additional Contrast? Oral   Final Result      Chest:   1. Large right upper lobe and right hilar mass consistent with primary lung cancer. 2.  Mild subcarinal and pretracheal lymphadenopathy may represent metastatic disease. 3.  Severe emphysema. 4.  No evidence of pulmonary embolism. Right upper lobe mass encases and narrows right upper lobe pulmonary artery branches as well as bronchioles. Abdomen and pelvis:   1. Small indeterminate left adrenal nodule.    2.  Otherwise, no evidence of metastatic disease in the abdomen and pelvis. 3.  Note that the study is limited due to paucity of intra-abdominal fat/cachexia. XR CHEST (2 VW)   Final Result      Large right upper lobe mass likely representing primary malignancy. Recommend CT chest or chest/abdomen/pelvis with contrast for further evaluation. Abnormal labs:   Abnormal Labs Reviewed   BASIC METABOLIC PANEL W/ REFLEX TO MG FOR LOW K - Abnormal; Notable for the following components:       Result Value    Sodium 132 (*)     Chloride 98 (*)     Glucose 166 (*)     Creatinine 0.5 (*)     All other components within normal limits   CBC WITH AUTO DIFFERENTIAL - Abnormal; Notable for the following components:    RBC 3.57 (*)     Hemoglobin 10.4 (*)     Hematocrit 31.6 (*)     RDW 15.8 (*)     All other components within normal limits   BRAIN NATRIURETIC PEPTIDE - Abnormal; Notable for the following components:    Pro- (*)     All other components within normal limits   SEDIMENTATION RATE - Abnormal; Notable for the following components:    Sed Rate 102 (*)     All other components within normal limits   C-REACTIVE PROTEIN - Abnormal; Notable for the following components:    CRP 80.6 (*)     All other components within normal limits   HEPATIC FUNCTION PANEL - Abnormal; Notable for the following components:     Total Protein 6.3 (*)     Albumin 2.7 (*)     Alkaline Phosphatase 155 (*)     All other components within normal limits   POCT GLUCOSE - Abnormal; Notable for the following components:    POC Glucose 105 (*)     All other components within normal limits     Critical values: no     Abnormal Assessment Findings: No critical values while under my care since 1500     Background  History:   Past Medical History:   Diagnosis Date    Hypertension        Assessment    Vitals/MEWS: MEWS Score: 2  Level of Consciousness: Alert (0)   Vitals:    02/18/23 0005 02/18/23 0322 02/18/23 1201 02/18/23 1633   BP:  (!) 153/80 (!) 155/104   Pulse: 95 98  99   Resp: 16 17  16   Temp: 98.1 °F (36.7 °C) 99.3 °F (37.4 °C)     TempSrc: Oral Oral     SpO2: 100% 98% 98% 99%   Weight: 126 lb (57.2 kg)      Height: 6' 1\" (1.854 m)        FiO2 (%): None  O2 Flow Rate: O2 Device: None (Room air)    Cardiac Rhythm: Cardiac Rhythm: Sinus rhythm, Sinus tachy  Pain Assessment:  [x] Verbal [] Artelia Aguilar Scale  Pain Scale: Pain Assessment  Pain Assessment: None - Denies Pain  Pain Level: 0  Pain Location: Leg  Pain Orientation: Right, Left  Pain Descriptors: Aching, Dull  Pain Type: Acute pain  Pain Frequency: Continuous  Last documented pain score (0-10 scale) Pain Level: 0  Last documented pain medication administered: No pain medications administered while under my care. Mental Status: oriented and alert  NIH Score:    C-SSRS: Risk of Suicide: No Risk  Bedside swallow:    [place] Coma Scale (GCS): [place] Coma Scale  Eye Opening: Spontaneous  Best Verbal Response: Oriented  Best Motor Response: Obeys commands  [place] Coma Scale Score: 15  Active LDA's:   Peripheral IV 02/17/23 Right (Active)   Site Assessment Clean, dry & intact 02/18/23 0603   Line Status Infusing 02/18/23 0603   Line Care Connections checked and tightened 02/18/23 0603   Phlebitis Assessment No symptoms 02/18/23 0603   Infiltration Assessment 0 02/18/23 0603   Dressing Status Clean, dry & intact 02/18/23 0603   Dressing Type Transparent 02/18/23 0603     PO Status: Regular  Pertinent or High Risk Medications/Drips: no   o If Yes, please provide details: None   Pending Blood Product Administration: no     You may also review the ED PT Care Timeline found under the Summary Nursing Index tab. Recommendation    Pending orders No pending ED orders. Plan for Discharge (if known): Additional Comments:  Patient is AAOx4 and able to follow commands approriately with no difficulty.  Patient is able to stand with no problem and take couple of steps for a short distance, but walking long distances is difficult for the patient.    If any further questions, please call Sending RN at [phone]    Electronically signed by: Electronically signed by Ginger Burger RN on 2/18/2023 at 4:38 PM     Ginger Burger, [address] [place] [address]  02/18/23 255 [address], RN  02/18/23 [address], RN  02/18/23 0708

## 2023-02-19 ENCOUNTER — APPOINTMENT (OUTPATIENT)
Dept: MRI IMAGING | Age: 53
DRG: 180 | End: 2023-02-19
Payer: MEDICAID

## 2023-02-19 LAB
ALBUMIN SERPL-MCNC: 2.7 G/DL (ref 3.4–5)
ANION GAP SERPL CALCULATED.3IONS-SCNC: 8 MMOL/L (ref 3–16)
BASOPHILS ABSOLUTE: 0.1 K/UL (ref 0–0.2)
BASOPHILS RELATIVE PERCENT: 0.7 %
BUN BLDV-MCNC: 7 MG/DL (ref 7–20)
CALCIUM SERPL-MCNC: 9.3 MG/DL (ref 8.3–10.6)
CHLORIDE BLD-SCNC: 101 MMOL/L (ref 99–110)
CO2: 27 MMOL/L (ref 21–32)
CREAT SERPL-MCNC: <0.5 MG/DL (ref 0.9–1.3)
EOSINOPHILS ABSOLUTE: 0.1 K/UL (ref 0–0.6)
EOSINOPHILS RELATIVE PERCENT: 0.8 %
FOLATE: 5.97 NG/ML (ref 4.78–24.2)
GFR SERPL CREATININE-BSD FRML MDRD: >60 ML/MIN/{1.73_M2}
GLUCOSE BLD-MCNC: 105 MG/DL (ref 70–99)
GLUCOSE BLD-MCNC: 95 MG/DL (ref 70–99)
GLUCOSE BLD-MCNC: 99 MG/DL (ref 70–99)
HCT VFR BLD CALC: 29.4 % (ref 40.5–52.5)
HEMOGLOBIN: 9.5 G/DL (ref 13.5–17.5)
IRON SATURATION: 13 % (ref 20–50)
IRON: 21 UG/DL (ref 59–158)
LYMPHOCYTES ABSOLUTE: 1.2 K/UL (ref 1–5.1)
LYMPHOCYTES RELATIVE PERCENT: 13.3 %
MAGNESIUM: 2 MG/DL (ref 1.8–2.4)
MCH RBC QN AUTO: 28.1 PG (ref 26–34)
MCHC RBC AUTO-ENTMCNC: 32.3 G/DL (ref 31–36)
MCV RBC AUTO: 86.8 FL (ref 80–100)
MONOCYTES ABSOLUTE: 1 K/UL (ref 0–1.3)
MONOCYTES RELATIVE PERCENT: 10.7 %
NEUTROPHILS ABSOLUTE: 6.8 K/UL (ref 1.7–7.7)
NEUTROPHILS RELATIVE PERCENT: 74.5 %
PDW BLD-RTO: 15.7 % (ref 12.4–15.4)
PERFORMED ON: NORMAL
PERFORMED ON: NORMAL
PHOSPHORUS: 2.7 MG/DL (ref 2.5–4.9)
PLATELET # BLD: 383 K/UL (ref 135–450)
PMV BLD AUTO: 7.2 FL (ref 5–10.5)
POTASSIUM SERPL-SCNC: 3.7 MMOL/L (ref 3.5–5.1)
RBC # BLD: 3.39 M/UL (ref 4.2–5.9)
SODIUM BLD-SCNC: 136 MMOL/L (ref 136–145)
TOTAL IRON BINDING CAPACITY: 167 UG/DL (ref 260–445)
TSH REFLEX: 1.73 UIU/ML (ref 0.27–4.2)
VITAMIN B-12: 399 PG/ML (ref 211–911)
WBC # BLD: 9.1 K/UL (ref 4–11)

## 2023-02-19 PROCEDURE — 6370000000 HC RX 637 (ALT 250 FOR IP): Performed by: STUDENT IN AN ORGANIZED HEALTH CARE EDUCATION/TRAINING PROGRAM

## 2023-02-19 PROCEDURE — 36415 COLL VENOUS BLD VENIPUNCTURE: CPT

## 2023-02-19 PROCEDURE — 83735 ASSAY OF MAGNESIUM: CPT

## 2023-02-19 PROCEDURE — 2060000000 HC ICU INTERMEDIATE R&B

## 2023-02-19 PROCEDURE — 85025 COMPLETE CBC W/AUTO DIFF WBC: CPT

## 2023-02-19 PROCEDURE — 6360000004 HC RX CONTRAST MEDICATION: Performed by: STUDENT IN AN ORGANIZED HEALTH CARE EDUCATION/TRAINING PROGRAM

## 2023-02-19 PROCEDURE — 70553 MRI BRAIN STEM W/O & W/DYE: CPT

## 2023-02-19 PROCEDURE — 80069 RENAL FUNCTION PANEL: CPT

## 2023-02-19 PROCEDURE — A9576 INJ PROHANCE MULTIPACK: HCPCS | Performed by: STUDENT IN AN ORGANIZED HEALTH CARE EDUCATION/TRAINING PROGRAM

## 2023-02-19 RX ORDER — ENOXAPARIN SODIUM 100 MG/ML
40 INJECTION SUBCUTANEOUS DAILY
Status: DISCONTINUED | OUTPATIENT
Start: 2023-02-19 | End: 2023-02-22 | Stop reason: HOSPADM

## 2023-02-19 RX ADMIN — Medication 100 MG: at 09:07

## 2023-02-19 RX ADMIN — GADOTERIDOL 12 ML: 279.3 INJECTION, SOLUTION INTRAVENOUS at 11:49

## 2023-02-19 RX ADMIN — AMLODIPINE BESYLATE 5 MG: 5 TABLET ORAL at 09:07

## 2023-02-19 NOTE — PROGRESS NOTES
4 Eyes Admission Assessment     I agree as the admission nurse that 2 RN's have performed a thorough Head to Toe Skin Assessment on the patient. ALL assessment sites listed below have been assessed on admission. Areas assessed by both nurses: Aníbal Bonus  [x]   Head, Face, and Ears   [x]   Shoulders, Back, and Chest  [x]   Arms, Elbows, and Hands   [x]   Coccyx, Sacrum, and Ischium  [x]   Legs, Feet, and Heels        Does the Patient have Skin Breakdown?   No         Luis Enrique Prevention initiated:  Yes   Wound Care Orders initiated:  NA      [facility] nurse consulted for Pressure Injury (Stage 3,4, Unstageable, DTI, NWPT, and Complex wounds) or Luis Enrique score 18 or lower:  NA      Nurse 1 eSignature: Electronically signed by Dena Cali on 2/18/23 at 7:15 PM EST    **SHARE this note so that the co-signing nurse is able to place an eSignature**    Nurse 2 eSignature: Electronically signed by Seth Galo RN on 2/18/23 at 8:53 PM EST

## 2023-02-19 NOTE — PLAN OF CARE
Problem: Safety - Adult  Goal: Free from fall injury  Outcome: Progressing  Note: Pt is a High fall risk. See Dolphus Ormond Fall Score and ABCDS Injury Risk assessments.   + Screening for Orthostasis and/or + High Fall Risk per BRIDGES/ABCDS: Explained fall risk precautions to pt and family and rationale behind their use to keep the patient safe. Pt bed is in low position, side rails up, call light and belongings are in reach. Fall wristband applied and present on pts wrist.  Bed alarm on. Pt encouraged to call for assistance. Will continue with hourly rounds for PO intake, pain needs, toileting and repositioning as needed.

## 2023-02-19 NOTE — PROGRESS NOTES
Pharmacist Review and Automatic Dose Adjustment of Prophylactic Enoxaparin    The reviewing pharmacist has made an adjustment to the ordered enoxaparin dose or converted to UFH per the approved [facility] protocol and table as identified below. Lucho Graham is a 45 y.o. male. Recent Labs     02/17/23  1834 02/19/23  0407   CREATININE 0.5* <0.5*       Estimated Creatinine Clearance: 137 mL/min (based on SCr of 0.5 mg/dL). Recent Labs     02/17/23  1834 02/19/23  0407   HGB 10.4* 9.5*   HCT 31.6* 29.4*    383     No results for input(s): INR in the last 72 hours. Height:   Ht Readings from Last 1 Encounters:   02/18/23 6' 1\" (1.854 m)     Weight:  Wt Readings from Last 1 Encounters:   02/18/23 126 lb (57.2 kg)       Plan: Based upon the patient's weight and renal function, the ordered enoxaparin dose of 40 mg twice daily has been changed/converted to 40 mg once daily.       Thank you,  Radha Call, [facility]  2/19/2023, 8:17 AM

## 2023-02-19 NOTE — PROGRESS NOTES
MD notified as pt requesting something PO vs heparin shot staying that in the past they hurt and burn. MD Dariana Moise replied: \"No Theres no po DVT prophylaxis Ask him to talk to his daytime provider please\".

## 2023-02-19 NOTE — PLAN OF CARE
Problem: ABCDS Injury Assessment  Goal: Absence of physical injury  Outcome: Progressing  No new physical injuries noted. Problem: Safety - Adult  Goal: Free from fall injury  Outcome: Progressing  Pt is a Med fall risk. See Yaneli Lomas Fall Score and ABCDS Injury Risk assessments. Explained fall risk precautions to pt and family and rationale behind their use to keep the patient safe. Pt bed is in low position, side rails up, call light and belongings are in reach. Fall wristband applied and present on pts wrist.  Chair Alarm on. Pt encouraged to call for assistance. Will continue with hourly rounds for PO intake, pain needs, toileting and repositioning as needed.

## 2023-02-19 NOTE — PROGRESS NOTES
Progress Note    Admit Date: 2/17/2023  Diet: ADULT DIET; Regular; 4 carb choices (60 gm/meal)    CC: leg swelling    Interval history: Patient was resting comfortably this morning. He reported he was feeling \"fine,\" and about the same as yesterday. He did not have any new pain or worsening swelling. He was not having any fevers, chills, nausea, shortness of breath, or headache. HPI: 44 yo M with HTN, tobacco and aclohol use present to ED with lower extremity swelling since Dec 2022. Tried PTC diuretics which helped. Swelling progressed to his lower and upper extremity. 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 Hg 10.4 (down from 15 in 2019). CXR with large RT UL mass, CR chest abdomen pelvis with con demonstrated a large right upper lobe and right hilar mass suspicious for malignancy. Severe emphysema. No evidence of PE. There was no sign of any metastatic disease in the abdomen or pelvis.  Admitted for further workup of RUL mass    Medications:     Scheduled Meds:   heparin (porcine)  5,000 Units SubCUTAneous 3 times per day    amLODIPine  5 mg Oral Daily    sodium chloride flush  5-40 mL IntraVENous 2 times per day    sodium chloride flush  5-40 mL IntraVENous 2 times per day    thiamine  100 mg Oral Daily    nicotine  1 patch TransDERmal Daily     Continuous Infusions:   sodium chloride      sodium chloride      dextrose       PRN Meds:perflutren lipid microspheres, ibuprofen, sodium chloride flush, sodium chloride, ondansetron **OR** ondansetron, polyethylene glycol, acetaminophen **OR** acetaminophen, sodium chloride flush, sodium chloride, glucose, dextrose bolus **OR** dextrose bolus, glucagon (rDNA), dextrose, labetalol, hydrALAZINE    Objective:   Vitals:   T-max:  Patient Vitals for the past 8 hrs:   BP Temp Temp src Pulse Resp SpO2   02/19/23 0400 132/77 98.8 °F (37.1 °C) Oral (!) 104 17 95 %         Intake/Output Summary (Last 24 hours) at 2/19/2023 0717  Last data filed at 2/19/2023 8831  Gross per 24 hour   Intake --   Output 2250 ml   Net -2250 ml     Physical Exam  Constitutional:       General: He is not in acute distress. Appearance: Normal appearance. He is ill-appearing. He is not toxic-appearing or diaphoretic. Comments: Appears emaciated   Eyes:      Pupils: Pupils are equal, round, and reactive to light. Cardiovascular:      Rate and Rhythm: Regular rhythm. Tachycardia present. Pulses: Normal pulses. Heart sounds: Normal heart sounds. No murmur heard. Pulmonary:      Effort: Pulmonary effort is normal. No respiratory distress. Breath sounds: Decreased breath sounds present. Abdominal:      General: Bowel sounds are normal. There is no distension. Palpations: Abdomen is soft. Tenderness: There is no abdominal tenderness. There is no guarding. Musculoskeletal:         General: Swelling present. Comments: Swelling in b/l upper extremities especially in hand and wrist, mildly tender to palpation of bilateral UE. Minimal swelling in lower extremities. Tender to palpation of bilateral LE. Range of motion intact in bilateral hands/fingers but difficult per pt due to swelling. Clubbing of fingers bilaterally. Neurological:      Mental Status: He is alert. Mental status is at baseline.      LABS:  CBC:   Recent Labs     02/17/23  1834 02/19/23  0407   WBC 9.3 9.1   HGB 10.4* 9.5*   HCT 31.6* 29.4*    383   MCV 88.5 86.8     Renal:    Recent Labs     02/17/23  1834 02/17/23  2312 02/19/23  0407   *  --  136   K 4.2  --  3.7   CL 98*  --  101   CO2 23  --  27   BUN 8  --  7   CREATININE 0.5*  --  <0.5*   GLUCOSE 166* 105 105*   CALCIUM 9.6  --  9.3   MG --   --  2.00   PHOS  --   --  2.7   ANIONGAP 11  --  8     Hepatic:   Recent Labs     02/18/23  1308 02/19/23  0407   AST 28  --    ALT 30  --    BILITOT 0.3  --    BILIDIR <0.2  --    PROT 6.3*  --    LABALBU 2.7* 2.7*   ALKPHOS 155*  --      Troponin:   Recent Labs     02/17/23  1834   TROPONINI <0.01     BNP: No results for input(s): BNP in the last 72 hours. Lipids: No results for input(s): CHOL, HDL in the last 72 hours. Invalid input(s): LDLCALCU, TRIGLYCERIDE  ABGs:  No results for input(s): PHART, ZBY2GRH, PO2ART, YVZ9GLJ, BEART, THGBART, A3QOSRSU, EQU6SFX in the last 72 hours. INR: No results for input(s): INR in the last 72 hours. Lactate: No results for input(s): LACTATE in the last 72 hours. Cultures:  -----------------------------------------------------------------  RAD:   CT CHEST PULMONARY EMBOLISM W CONTRAST   Final Result      Chest:   1. Large right upper lobe and right hilar mass consistent with primary lung cancer. 2.  Mild subcarinal and pretracheal lymphadenopathy may represent metastatic disease. 3.  Severe emphysema. 4.  No evidence of pulmonary embolism. Right upper lobe mass encases and narrows right upper lobe pulmonary artery branches as well as bronchioles. Abdomen and pelvis:   1. Small indeterminate left adrenal nodule. 2.  Otherwise, no evidence of metastatic disease in the abdomen and pelvis. 3.  Note that the study is limited due to paucity of intra-abdominal fat/cachexia. CT ABDOMEN PELVIS W IV CONTRAST Additional Contrast? Oral   Final Result      Chest:   1. Large right upper lobe and right hilar mass consistent with primary lung cancer. 2.  Mild subcarinal and pretracheal lymphadenopathy may represent metastatic disease. 3.  Severe emphysema. 4.  No evidence of pulmonary embolism. Right upper lobe mass encases and narrows right upper lobe pulmonary artery branches as well as bronchioles. Abdomen and pelvis:   1.   Small indeterminate left adrenal nodule. 2.  Otherwise, no evidence of metastatic disease in the abdomen and pelvis. 3.  Note that the study is limited due to paucity of intra-abdominal fat/cachexia. XR CHEST (2 VW)   Final Result      Large right upper lobe mass likely representing primary malignancy. Recommend CT chest or chest/abdomen/pelvis with contrast for further evaluation. 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   -Bronchoscopic and EBUS biopsy early next week   -Ibuprofen/NSAIDs for pain management  - PT/OT  - MRI brain for metastatic workup    Low BMI  -Pt reports 7-lb unintentional weight loss  -Likely from underlying malignancy  -Historically low BMI per chart (16.68 in 2014)  -Dietitian consulted  -Current diet carb control restriction  -Determine if carb limit could worsen calorie deficit    Hypertension  -Continue norvasc 5mg QD      Code Status:Full code  FEN: ADULT DIET;  Regular; 4 carb choices (60 gm/meal)  PPX: Lovenox  DISPO: Dania Pizarro, MS3  02/19/23  7:17 AM    Estee Jimenez MD Internal Medicine PGY-1  2/19/2023  8:06 AM

## 2023-02-20 PROBLEM — E43 SEVERE MALNUTRITION (HCC): Chronic | Status: ACTIVE | Noted: 2023-02-20

## 2023-02-20 LAB
ALBUMIN SERPL-MCNC: 2.6 G/DL (ref 3.4–5)
AMPHETAMINE SCREEN, URINE: NORMAL
ANION GAP SERPL CALCULATED.3IONS-SCNC: 8 MMOL/L (ref 3–16)
BARBITURATE SCREEN URINE: NORMAL
BASOPHILS ABSOLUTE: 0 K/UL (ref 0–0.2)
BASOPHILS RELATIVE PERCENT: 0.6 %
BENZODIAZEPINE SCREEN, URINE: NORMAL
BUN BLDV-MCNC: 11 MG/DL (ref 7–20)
CALCIUM SERPL-MCNC: 9 MG/DL (ref 8.3–10.6)
CANNABINOID SCREEN URINE: NORMAL
CHLORIDE BLD-SCNC: 98 MMOL/L (ref 99–110)
CO2: 27 MMOL/L (ref 21–32)
COCAINE METABOLITE SCREEN URINE: NORMAL
CREAT SERPL-MCNC: <0.5 MG/DL (ref 0.9–1.3)
EOSINOPHILS ABSOLUTE: 0.1 K/UL (ref 0–0.6)
EOSINOPHILS RELATIVE PERCENT: 0.8 %
FENTANYL SCREEN, URINE: NORMAL
GFR SERPL CREATININE-BSD FRML MDRD: >60 ML/MIN/{1.73_M2}
GLUCOSE BLD-MCNC: 101 MG/DL (ref 70–99)
HCT VFR BLD CALC: 30.3 % (ref 40.5–52.5)
HEMOGLOBIN: 9.7 G/DL (ref 13.5–17.5)
LV EF: 58 %
LVEF MODALITY: NORMAL
LYMPHOCYTES ABSOLUTE: 1.5 K/UL (ref 1–5.1)
LYMPHOCYTES RELATIVE PERCENT: 17.1 %
Lab: NORMAL
MAGNESIUM: 2 MG/DL (ref 1.8–2.4)
MCH RBC QN AUTO: 28.1 PG (ref 26–34)
MCHC RBC AUTO-ENTMCNC: 32.1 G/DL (ref 31–36)
MCV RBC AUTO: 87.4 FL (ref 80–100)
METHADONE SCREEN, URINE: NORMAL
MONOCYTES ABSOLUTE: 0.8 K/UL (ref 0–1.3)
MONOCYTES RELATIVE PERCENT: 9.5 %
NEUTROPHILS ABSOLUTE: 6.2 K/UL (ref 1.7–7.7)
NEUTROPHILS RELATIVE PERCENT: 72 %
OPIATE SCREEN URINE: NORMAL
OXYCODONE URINE: NORMAL
PDW BLD-RTO: 15.8 % (ref 12.4–15.4)
PHENCYCLIDINE SCREEN URINE: NORMAL
PHOSPHORUS: 2.8 MG/DL (ref 2.5–4.9)
PLATELET # BLD: 388 K/UL (ref 135–450)
PMV BLD AUTO: 7.7 FL (ref 5–10.5)
POTASSIUM SERPL-SCNC: 3.8 MMOL/L (ref 3.5–5.1)
RBC # BLD: 3.46 M/UL (ref 4.2–5.9)
SODIUM BLD-SCNC: 133 MMOL/L (ref 136–145)
WBC # BLD: 8.6 K/UL (ref 4–11)

## 2023-02-20 PROCEDURE — 6370000000 HC RX 637 (ALT 250 FOR IP): Performed by: STUDENT IN AN ORGANIZED HEALTH CARE EDUCATION/TRAINING PROGRAM

## 2023-02-20 PROCEDURE — 36415 COLL VENOUS BLD VENIPUNCTURE: CPT

## 2023-02-20 PROCEDURE — 2060000000 HC ICU INTERMEDIATE R&B

## 2023-02-20 PROCEDURE — 97161 PT EVAL LOW COMPLEX 20 MIN: CPT

## 2023-02-20 PROCEDURE — 2580000003 HC RX 258: Performed by: STUDENT IN AN ORGANIZED HEALTH CARE EDUCATION/TRAINING PROGRAM

## 2023-02-20 PROCEDURE — 97166 OT EVAL MOD COMPLEX 45 MIN: CPT

## 2023-02-20 PROCEDURE — 85025 COMPLETE CBC W/AUTO DIFF WBC: CPT

## 2023-02-20 PROCEDURE — 83735 ASSAY OF MAGNESIUM: CPT

## 2023-02-20 PROCEDURE — 97530 THERAPEUTIC ACTIVITIES: CPT

## 2023-02-20 PROCEDURE — 97116 GAIT TRAINING THERAPY: CPT

## 2023-02-20 PROCEDURE — 93306 TTE W/DOPPLER COMPLETE: CPT

## 2023-02-20 PROCEDURE — 80069 RENAL FUNCTION PANEL: CPT

## 2023-02-20 PROCEDURE — 80307 DRUG TEST PRSMV CHEM ANLYZR: CPT

## 2023-02-20 RX ORDER — LORAZEPAM 2 MG/ML
4 INJECTION INTRAMUSCULAR
Status: DISCONTINUED | OUTPATIENT
Start: 2023-02-20 | End: 2023-02-22 | Stop reason: HOSPADM

## 2023-02-20 RX ORDER — LORAZEPAM 2 MG/ML
2 INJECTION INTRAMUSCULAR
Status: DISCONTINUED | OUTPATIENT
Start: 2023-02-20 | End: 2023-02-22 | Stop reason: HOSPADM

## 2023-02-20 RX ORDER — LORAZEPAM 1 MG/1
3 TABLET ORAL
Status: DISCONTINUED | OUTPATIENT
Start: 2023-02-20 | End: 2023-02-22 | Stop reason: HOSPADM

## 2023-02-20 RX ORDER — LORAZEPAM 2 MG/ML
1 INJECTION INTRAMUSCULAR
Status: DISCONTINUED | OUTPATIENT
Start: 2023-02-20 | End: 2023-02-22 | Stop reason: HOSPADM

## 2023-02-20 RX ORDER — LORAZEPAM 1 MG/1
1 TABLET ORAL
Status: DISCONTINUED | OUTPATIENT
Start: 2023-02-20 | End: 2023-02-22 | Stop reason: HOSPADM

## 2023-02-20 RX ORDER — LORAZEPAM 1 MG/1
4 TABLET ORAL
Status: DISCONTINUED | OUTPATIENT
Start: 2023-02-20 | End: 2023-02-22 | Stop reason: HOSPADM

## 2023-02-20 RX ORDER — LORAZEPAM 1 MG/1
2 TABLET ORAL
Status: DISCONTINUED | OUTPATIENT
Start: 2023-02-20 | End: 2023-02-22 | Stop reason: HOSPADM

## 2023-02-20 RX ORDER — LORAZEPAM 2 MG/ML
3 INJECTION INTRAMUSCULAR
Status: DISCONTINUED | OUTPATIENT
Start: 2023-02-20 | End: 2023-02-22 | Stop reason: HOSPADM

## 2023-02-20 RX ADMIN — SODIUM CHLORIDE, PRESERVATIVE FREE 10 ML: 5 INJECTION INTRAVENOUS at 09:35

## 2023-02-20 RX ADMIN — Medication 100 MG: at 09:38

## 2023-02-20 RX ADMIN — AMLODIPINE BESYLATE 5 MG: 5 TABLET ORAL at 09:38

## 2023-02-20 RX ADMIN — SODIUM CHLORIDE, PRESERVATIVE FREE 10 ML: 5 INJECTION INTRAVENOUS at 21:21

## 2023-02-20 NOTE — PROGRESS NOTES
Occupational Therapy  Facility/Department: [facility]  Occupational Therapy Initial Assessment/Tx Note    Name: Zoltan Valenzuela  : 1970  MRN: [id number]  Date of Service: 2023    Assessment: Pt typically lives alone, independent with mobility and ADLs. Currently, activity tolerance is limited and pt requires CGA and walker for mobility. Limited ADL assessment today, but pt reports ADLs performance is slow and effortful. Will continue OT during admit. Recommend initial 24 hr A and home OT at d/c. Discharge Recommendations: Zoltan Jonasr scored a  on the AM-PAC ADL Inpatient form. Current research shows that an AM-PAC score of 18 or greater is typically associated with a discharge to the patient's home setting. Based on the patient's AM-PAC score, and their current ADL deficits, it is recommended that the patient have 2-3 sessions per week of Occupational Therapy at d/c to increase the patient's independence. At this time, this patient demonstrates the endurance and safety to discharge home with 24 hr A, home OT and a follow up treatment frequency of 2-3x/wk. Please see assessment section for further patient specific details. OT Equipment Recommendations  Equipment Needed: Yes (shower chair)    Treatment Diagnosis: Decreased activity tolerance, impaired ADLs and mobility      Assessment   Performance deficits / Impairments: Decreased functional mobility ; Decreased ADL status; Decreased balance;Decreased high-level IADLs;Decreased endurance;Decreased strength  Treatment Diagnosis: Decreased activity tolerance, impaired ADLs and mobility  Decision Making: Medium Complexity  REQUIRES OT FOLLOW-UP: Yes  Activity Tolerance  Activity Tolerance: Patient Tolerated treatment well        Plan   Occupational Therapy Plan  Times Per Week: 2-5x  Times Per Day:  Once a day  Current Treatment Recommendations: Strengthening, Balance training, Functional mobility training, Endurance training, Patient/Caregiver education & training, Safety education & training, Equipment evaluation, education, & procurement, Self-Care / ADL     Restrictions  Position Activity Restriction  Other position/activity restrictions: up with assist    Subjective   General  Chart Reviewed: Yes  Additional Pertinent Hx: 45 y.o. M admitted 2/17 with LE swelling, likely new onset CHF, pulmonary HTN. Imaging revealed Newly diagnosed large right upper lobe and right hilar mass suspicious for lung cancer with subcarinal lymph pretracheal lymphadenopathy-likely metastatic disease. PMHx includes emphysema, HTN  Family / Caregiver Present: No  Referring Practitioner: Maureen Gary  Subjective  Subjective: Pt in bed on entry. Pleasant and cooperative with getting OOB with OT. General Comment  Comments: c/o generalized stiffness and soreness     Social/Functional History  Social/Functional History  Lives With: Alone  Type of Home: Apartment  Home Layout: Laundry in basement  Home Access: Stairs to enter with rails  Entrance Stairs - Number of Steps: three flights to apartment  Bathroom Shower/Tub: Tub/Shower unit  Bathroom Toilet: Standard (sink nearby)  [employer]: None  Bathroom Accessibility: Accessible  Has the patient had two or more falls in the past year or any fall with injury in the past year?: No  Receives Help From: Family (nephew)  ADL Assistance: Independent  Homemaking Assistance: Independent  Ambulation Assistance: Independent  Transfer Assistance: Independent  Active : No  Patient's  Info: nephew does errands erc  Occupation: Unemployed  Type of Occupation: was working in a [employer] [employer] [employer] and was let go recently 2/2 inability to perform job per pt. Objective     Safety Devices  Type of Devices: Nurse notified;Call light within reach; Left in chair;Chair alarm in place  Bed Mobility Training  Bed Mobility Training: Yes  Supine to Sit: Modified independent  Scooting: Modified independent  Balance  Sitting: Intact  Standing: With support (CGA)  Transfer Training  Transfer Training: Yes  Sit to Stand: Stand-by assistance  Stand to Sit: Stand-by assistance  Bed to Chair: Contact-guard assistance (stand step using furniture and OT for UE support, slow and stiff)        ADL  Feeding: Independent; Beverage management  LE Dressing Skilled Clinical Factors: Pt dressed. Reports lower body dressing takes increased time and effort but he \"gets the job done. \"  Toileting Skilled Clinical Factors: reports getting to bathroom is effortful, but is able to use urinal at bedside without difficulty  Additional Comments: Will continue to assess/tx ADLs     Activity Tolerance  Activity Tolerance: Patient tolerated treatment well;Patient tolerated evaluation without incident           Education Given To: Patient  Education Provided: Role of Therapy;Plan of Care;ADL Adaptive Strategies;Transfer Training;Energy Conservation; Fall Prevention Strategies  Education Method: Verbal  Barriers to Learning: None  Education Outcome: Verbalized understanding          AM-PAC Score        AM-[facility] Inpatient Daily Activity Raw Score: 19 (02/20/23 1529)  AM-PAC Inpatient ADL T-Scale Score : 40.22 (02/20/23 1529)  ADL Inpatient CMS 0-100% Score: 42.8 (02/20/23 1529)  ADL Inpatient CMS G-Code Modifier : CK (02/20/23 1529)      Goals  Short Term Goals  Time Frame for Short Term Goals: by D/C  Short Term Goal 1: Transfer to/from all common surfaces with spvn - Not met  Short Term Goal 2:  Increase standing/mobility tolerance to 5 min - Not met  Short Term Goal 3: Dress lower body with spvn - Not met  Short Term Goal 4: Functional mobility for ADLs/item retrieval spvn - Not met       Therapy Time   Individual Concurrent Group Co-treatment   Time In 1507         Time Out 1523         Minutes 16          Timed Code Tx Min: 0  Total Tx Time: 16       Samai Delgado, OT

## 2023-02-20 NOTE — PLAN OF CARE
Problem: Safety - Adult  Goal: Free from fall injury  Outcome: Progressing  Note: Pt is a High fall risk. See Darnella Evans Fall Score and ABCDS Injury Risk assessments.   + Screening for Orthostasis and/or + High Fall Risk per BRIDGES/ABCDS: Explained fall risk precautions to pt and family and rationale behind their use to keep the patient safe. Pt bed is in low position, side rails up, call light and belongings are in reach. Fall wristband applied and present on pts wrist.  Bed alarm on. Pt encouraged to call for assistance. Will continue with hourly rounds for PO intake, pain needs, toileting and repositioning as needed.

## 2023-02-20 NOTE — PROGRESS NOTES
Pulmonary Followup Note    CC: lower extremity swelling and pain    Subjective:  Afebrile. Resting comfortably satting 98% on room air. Anticipating his upcoming bronchoscopy tomorrow. No new acute complaints except as below per ROS. ROS:  Endorses joint and bone pain in the distal bilateral upper extremities and bilateral lower extremities. Denies headache, nausea or chest pain. Denies SOB. 24HR INTAKE/OUTPUT:    Intake/Output Summary (Last 24 hours) at 2023 1105  Last data filed at 2023 0940  Gross per 24 hour   Intake --   Output 1150 ml   Net -1150 ml        enoxaparin  40 mg SubCUTAneous Daily    amLODIPine  5 mg Oral Daily    sodium chloride flush  5-40 mL IntraVENous 2 times per day    sodium chloride flush  5-40 mL IntraVENous 2 times per day    thiamine  100 mg Oral Daily    nicotine  1 patch TransDERmal Daily           PHYSICAL EXAMINATION:  /85   Pulse (!) 109   Temp 97.8 °F (36.6 °C) (Oral)   Resp 18   Ht 6' 1\" (1.854 m)   Wt 122 lb 6.4 oz (55.5 kg)   SpO2 98%   BMI 16.15 kg/m²   CURRENT PULSE OXIMETRY:  SpO2: 98 %  24HR PULSE OXIMETRY RANGE:  SpO2  Av %  Min: 96 %  Max: 100 % on room air. Gen: No acute distress. Speaking in full sentences without accessory muscle use  HEENT: PERRL, EOMI, OP nl  Lung:  Decreased lung sounds on the right upper/middle chest, otherwise CTAB. CV: RRR without M/R/R  Abd: +BS, soft, NT/ND  Ext: No edema.     DATA  CBC:   Recent Labs     237 23   WBC 9.3 9.1 8.6   HGB 10.4* 9.5* 9.7*   HCT 31.6* 29.4* 30.3*   MCV 88.5 86.8 87.4    383 388     BMP:   Recent Labs     23  [id number] 23   * 136 133*   K 4.2 3.7 3.8   CL 98* 101 98*   CO2 23 27 27   PHOS  --  2.7 2.8   BUN 8 7 11   CREATININE 0.5* <0.5* <0.5*     LIVER PROFILE:   Recent Labs     23  1308   AST 28   ALT 30   BILIDIR <0.2   BILITOT 0.3   ALKPHOS 155* CXR REVIEWED BY ME AND SHOWED:  MRI BRAIN W WO CONTRAST   Final Result      1. No acute intracranial abnormality. No evidence of intracranial metastatic disease. CT CHEST PULMONARY EMBOLISM W CONTRAST   Final Result      Chest:   1. Large right upper lobe and right hilar mass consistent with primary lung cancer. 2.  Mild subcarinal and pretracheal lymphadenopathy may represent metastatic disease. 3.  Severe emphysema. 4.  No evidence of pulmonary embolism. Right upper lobe mass encases and narrows right upper lobe pulmonary artery branches as well as bronchioles. Abdomen and pelvis:   1. Small indeterminate left adrenal nodule. 2.  Otherwise, no evidence of metastatic disease in the abdomen and pelvis. 3.  Note that the study is limited due to paucity of intra-abdominal fat/cachexia. CT ABDOMEN PELVIS W IV CONTRAST Additional Contrast? Oral   Final Result      Chest:   1. Large right upper lobe and right hilar mass consistent with primary lung cancer. 2.  Mild subcarinal and pretracheal lymphadenopathy may represent metastatic disease. 3.  Severe emphysema. 4.  No evidence of pulmonary embolism. Right upper lobe mass encases and narrows right upper lobe pulmonary artery branches as well as bronchioles. Abdomen and pelvis:   1. Small indeterminate left adrenal nodule. 2.  Otherwise, no evidence of metastatic disease in the abdomen and pelvis. 3.  Note that the study is limited due to paucity of intra-abdominal fat/cachexia. XR CHEST (2 VW)   Final Result      Large right upper lobe mass likely representing primary malignancy. Recommend CT chest or chest/abdomen/pelvis with contrast for further evaluation. ASSESSMENT/PLAN:  This is a 45 y.o. 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 Long-time smoker with about 35 years smoking 1.5 ppd. History of brain cancer in his aunt.    Presented with lower extremity swelling and pain. Was hypertensive, not on home medications. CT revealing large right upper lobe and right hilar mass encasing and narrowing the right upper lobe pulmonary artery branches & bronchioles, consistent with primary lung cancer; mild subcarinal and pretracheal LAD, possibly metastatic; severe emphysema; small indeterminate left adrenal nodule. Primary lung malignancy with possible left adrenal metastasis. Will perform bronchoscopy with EBUS for possible biopsy of primary tomorrow. Will ask IR to consider biopsying his left adrenal nodule for possible staging.        Patient seen and discussed with attending pulmonologist Dr. Brendon Fernandez MD.      nAisha Doyle MD, MS, PGY-2  Internal Medicine, [facility]  02/20/23 11:11 AM

## 2023-02-20 NOTE — PROGRESS NOTES
Occupational Therapy    Attempted OT eval. Pt napping and would like to wait until this afternoon. Pt already completed PT eval this morning. Will return later today as pt and therapy schedules allow.     Samia Delgado, OTR/L, [id number]

## 2023-02-20 NOTE — PLAN OF CARE
Problem: Safety - Adult  Goal: Free from fall injury  2/20/2023 1511 by Deon Yoo RN  Outcome: Progressing   Pt a stand-by assist. Pt did work with PT this morning. Encouraging pt to call for assistance when needed. All safety measures and hourly rounding in place. Will monitor. Problem: Cardiovascular - Adult  Goal: Maintains optimal cardiac output and hemodynamic stability  Outcome: Progressing   Pt on tele, NSR to sinus tachy, other VSS. Pt with adequate output. Will monitor. Problem: Respiratory - Adult  Goal: Achieves optimal ventilation and oxygenation  Outcome: Progressing   Pt on room air with O2 sats >95% this shift.

## 2023-02-20 NOTE — PROGRESS NOTES
Progress Note    Admit Date: 2/17/2023  Diet: NPO for procedure, regular diet    CC: leg swelling    Interval history: Patient was sitting up in a chair comfortably this morning. He reported feeling fine. Of all of his joints, his knees are currently the most painful at 4/10. Otherwise he feels his joint stiffness and pain is improving. Denies SOB, CP, wheezing, palpitations. HPI: 46 yo M with HTN, tobacco and aclohol use present to ED with lower extremity swelling since Dec 2022. Tried PTC diuretics which helped. Swelling progressed to his lower and upper extremity. 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 Hg 10.4 (down from 15 in 2019). CXR with large RT UL mass, CR chest abdomen pelvis with con demonstrated a large right upper lobe and right hilar mass suspicious for malignancy. Severe emphysema. No evidence of PE. There was no sign of any metastatic disease in the abdomen or pelvis.  Admitted for further workup of RUL mass    Medications:     Scheduled Meds:   enoxaparin  40 mg SubCUTAneous Daily    amLODIPine  5 mg Oral Daily    sodium chloride flush  5-40 mL IntraVENous 2 times per day    sodium chloride flush  5-40 mL IntraVENous 2 times per day    thiamine  100 mg Oral Daily    nicotine  1 patch TransDERmal Daily     Continuous Infusions:   sodium chloride      sodium chloride      dextrose       PRN Meds:perflutren lipid microspheres, ibuprofen, sodium chloride flush, sodium chloride, ondansetron **OR** ondansetron, polyethylene glycol, acetaminophen **OR** acetaminophen, sodium chloride flush, sodium chloride, glucose, dextrose bolus **OR** dextrose bolus, glucagon (rDNA), dextrose, labetalol, hydrALAZINE    Objective:   Vitals:   T-max:  Patient Vitals for the past 8 hrs:   BP Temp Temp src Pulse Resp SpO2 Weight   02/20/23 0929 110/85 97.8 °F (36.6 °C) Oral (!) 109 18 98 % 122 lb 6.4 oz (55.5 kg)   02/20/23 0307 (!) 151/86 98.2 °F (36.8 °C) Oral (!) 104 18 98 % --         Intake/Output Summary (Last 24 hours) at 2/20/2023 0946  Last data filed at 2/20/2023 0940  Gross per 24 hour   Intake --   Output 1150 ml   Net -1150 ml     Physical Exam  Constitutional:       General: He is not in acute distress. Appearance: Normal appearance. He is ill-appearing. He is not toxic-appearing or diaphoretic. Comments: Appears emaciated   Eyes:      Pupils: Pupils are equal, round, and reactive to light. Cardiovascular:      Rate and Rhythm: Regular rhythm. Tachycardia present. Pulses: Normal pulses. Heart sounds: Normal heart sounds. No murmur heard. Pulmonary:      Effort: Pulmonary effort is normal. No respiratory distress. Breath sounds: Decreased breath sounds present. Abdominal:      General: Bowel sounds are normal. There is no distension. Palpations: Abdomen is soft. Tenderness: There is no abdominal tenderness. There is no guarding. Musculoskeletal:         General: Swelling present. Comments: Swelling b/l upper extremities improved from prior exam, mildly tender to palpation of bilateral UE. Minimal swelling in lower extremities. Tender to palpation of b/l LE especially knees. Range of motion intact and improving in bilateral hands/fingers  Clubbing of fingers bilaterally. Neurological:      Mental Status: He is alert. Mental status is at baseline.      LABS:  CBC:   Recent Labs     02/17/23  1834 02/19/23  0407 02/20/23  0345   WBC 9.3 9.1 8.6   HGB 10.4* 9.5* 9.7*   HCT 31.6* 29.4* 30.3*    383 388   MCV 88.5 86.8 87.4     Renal:    Recent Labs     02/17/23  1834 02/17/23  2312 02/19/23  0407 02/20/23  0345   *  --  136 133*   K 4.2  --  3.7 3.8 CL 98*  --  101 98*   CO2 23  --  27 27   BUN 8  --  7 11   CREATININE 0.5*  --  <0.5* <0.5*   GLUCOSE 166* 105 105* 101*   CALCIUM 9.6  --  9.3 9.0   MG  --   --  2.00 2.00   PHOS  --   --  2.7 2.8   ANIONGAP 11  --  8 8     Hepatic:   Recent Labs     02/18/23  1308 02/19/23  0407 02/20/23  0345   AST 28  --   --    ALT 30  --   --    BILITOT 0.3  --   --    BILIDIR <0.2  --   --    PROT 6.3*  --   --    LABALBU 2.7* 2.7* 2.6*   ALKPHOS 155*  --   --      Troponin:   Recent Labs     02/17/23  1834   TROPONINI <0.01     BNP: No results for input(s): BNP in the last 72 hours. Lipids: No results for input(s): CHOL, HDL in the last 72 hours. Invalid input(s): LDLCALCU, TRIGLYCERIDE  ABGs:  No results for input(s): PHART, YUX4VSJ, PO2ART, FSV2QHM, BEART, THGBART, X8EHZLTM, SUJ4YPP in the last 72 hours. INR: No results for input(s): INR in the last 72 hours. Lactate: No results for input(s): LACTATE in the last 72 hours. Cultures:  -----------------------------------------------------------------  RAD:   MRI BRAIN W WO CONTRAST   Final Result      1. No acute intracranial abnormality. No evidence of intracranial metastatic disease. CT CHEST PULMONARY EMBOLISM W CONTRAST   Final Result      Chest:   1. Large right upper lobe and right hilar mass consistent with primary lung cancer. 2.  Mild subcarinal and pretracheal lymphadenopathy may represent metastatic disease. 3.  Severe emphysema. 4.  No evidence of pulmonary embolism. Right upper lobe mass encases and narrows right upper lobe pulmonary artery branches as well as bronchioles. Abdomen and pelvis:   1. Small indeterminate left adrenal nodule. 2.  Otherwise, no evidence of metastatic disease in the abdomen and pelvis. 3.  Note that the study is limited due to paucity of intra-abdominal fat/cachexia. CT ABDOMEN PELVIS W IV CONTRAST Additional Contrast? Oral   Final Result      Chest:   1.   Large right upper lobe and right hilar mass consistent with primary lung cancer. 2.  Mild subcarinal and pretracheal lymphadenopathy may represent metastatic disease. 3.  Severe emphysema. 4.  No evidence of pulmonary embolism. Right upper lobe mass encases and narrows right upper lobe pulmonary artery branches as well as bronchioles. Abdomen and pelvis:   1. Small indeterminate left adrenal nodule. 2.  Otherwise, no evidence of metastatic disease in the abdomen and pelvis. 3.  Note that the study is limited due to paucity of intra-abdominal fat/cachexia. XR CHEST (2 VW)   Final Result      Large right upper lobe mass likely representing primary malignancy. Recommend CT chest or chest/abdomen/pelvis with contrast for further evaluation.            Assessment/Plan:     Right upper lobe lung mass  Tobacco use disorder  Unintentional weight loss, night sweats  Extreme fatigue  Peripheral edema  Anemia  -TSH WNL, Vit B12/folate WNL  -Iron/Anemia profile   -Iron 21, Iron sat 13, TIBC 167, Ferritin 251.7   -Consistent with anemia of chronic disease likely 2/2 malignancy  -Echo ordered 2/18 to r/o cardiac causes of edema, pending  -Pulmonology following   -Bronchoscopic and EBUS biopsy today   -Suspected lung cancer with mediastinal LAP   -Hypertrophic osteoarthropathy 2/2 to lung CA   -Ibuprofen/NSAIDs for pain management  -PT/OT   -MRI brain 2/19 no acute intracranial abnormality, no evidence of intracranial metastatic disease    Low BMI  -Pt reports 7-lb unintentional weight loss  -Likely from underlying malignancy  -Historically low BMI per chart (16.68 in 2014)  -Dietitian consulted  -Regular diet s/p procedure today    Hypertension  -Well-controlled w/o use of PRN labetalol/hydralazine  -Continue norvasc 5mg QD      Code Status:Full code  FEN: NPO for procedure today, regular diet s/p procedure  PPX: Lovenox  DISPO: RACHAEL    Ger Broderick, MS3  02/20/23  9:46 AM    Michelle Garcia MD Internal Medicine PGY-1  2/20/2023  9:57 AM

## 2023-02-20 NOTE — CONSULTS
Comprehensive Nutrition Assessment    RECOMMENDATIONS:  PO Diet: Continue regular diet; NPO at midnight for bronch  ONS: Start Ensure +HP BID  Nutrition Education: Education completed (high kcal/high protein)     NUTRITION ASSESSMENT:   Nutritional summary & status: Consult for unintentional wt loss. Pt reports 8# wt loss over 2.5 months. Unable to confirm through EMR. If reported wt loss accurate, pt w/ severe wt loss over 2.5 months. Noted pt w/ trace BLE/BUE edema. Actual wt loss likely higher. CT showed large mass consistent with primary lung cancer. Bronchoscopy scheduled for tomorrow w/ biopsy for staging. Pt reports poor po intake over 2.5 months however po intake since adm has improved. Pt agreeable to ONS during adm. Admission/PMH: large right upper lobe and right hilar mass suspicious for lung cancer with subcarinal lymph pretracheal lymphadenopathy-likely metastatic disease // HTN    MALNUTRITION ASSESSMENT  Context of Malnutrition: Acute Illness   Malnutrition Status: Severe malnutrition  Findings of the 6 clinical characteristics of malnutrition (Minimum of 2 out of 6 clinical characteristics is required to make the diagnosis of moderate or severe Protein Calorie Malnutrition based on AND/ASPEN Guidelines):  Energy Intake:  50% or less of estimated energy requirements for 5 or more days  Weight Loss:  Greater than 5% over 1 month     Body Fat Loss: Moderate body fat loss Triceps, Buccal region, Orbital   Muscle Mass Loss:   Moderate muscle mass loss Temples (temporalis), Clavicles (pectoralis & deltoids), Thigh (quadraceps), Scapula (trapezius)    NUTRITION DIAGNOSIS   Severe malnutrition related to catabolic illness as evidenced by Criteria as identified in malnutrition assessment    Nutrition Monitoring and Evaluation:   Food/Nutrient Intake Outcomes:  Food and Nutrient Intake, Supplement Intake  Physical Signs/Symptoms Outcomes:  Biochemical Data, Nutrition Focused Physical Findings, Weight     OBJECTIVE DATA: Significant to nutrition assessment  Nutrition Related Findings: lbm pta; trace BLE/BUE edema; Na 133  Wounds: None  Nutrition Goals: PO intake 75% or greater, prior to discharge     [facility] DIET; Regular  Diet NPO Exceptions are: Sips of Water with Meds, Ice Chips  PO Intake: Unable to assess   PO Supplement Intake:None Ordered  Additional Sources of Calories/IVF:n/a     ANTHROPOMETRICS  Current Height: 6' 1\" (185.4 cm)  Current Weight: 122 lb 6.4 oz (55.5 kg)    Admission weight: 126 lb (57.2 kg)  Ideal Body Weight (IBW): 184 lbs  (84 kg)    Usual Bodyweight  (131-133#)   BMI: 16.2    COMPARATIVE STANDARDS  Energy (kcal):  1600-1900kcal/d (30-35kcal/kg CBW)     Protein (g):  83-99g/d (1.5-1.8g/kg CBW)       Fluid (mL/day):  1ml/kcal or defer to MD    The patient will be monitored per nutrition standards of care. Consult dietitian if additional nutrition interventions are needed prior to RD reassessment.      Merritt Cockayne, [address], [address] [place] [address]  [place]:  [phone]  Office:  [phone]

## 2023-02-21 ENCOUNTER — ANESTHESIA EVENT (OUTPATIENT)
Dept: ENDOSCOPY | Age: 53
DRG: 180 | End: 2023-02-21
Payer: MEDICAID

## 2023-02-21 ENCOUNTER — ANESTHESIA (OUTPATIENT)
Dept: ENDOSCOPY | Age: 53
DRG: 180 | End: 2023-02-21
Payer: MEDICAID

## 2023-02-21 LAB
ALBUMIN SERPL-MCNC: 2.6 G/DL (ref 3.4–5)
ANION GAP SERPL CALCULATED.3IONS-SCNC: 7 MMOL/L (ref 3–16)
BASOPHILS ABSOLUTE: 0 K/UL (ref 0–0.2)
BASOPHILS RELATIVE PERCENT: 0.3 %
BUN BLDV-MCNC: 11 MG/DL (ref 7–20)
CALCIUM SERPL-MCNC: 9.3 MG/DL (ref 8.3–10.6)
CHLORIDE BLD-SCNC: 98 MMOL/L (ref 99–110)
CO2: 28 MMOL/L (ref 21–32)
CREAT SERPL-MCNC: <0.5 MG/DL (ref 0.9–1.3)
EOSINOPHILS ABSOLUTE: 0.1 K/UL (ref 0–0.6)
EOSINOPHILS RELATIVE PERCENT: 0.9 %
GFR SERPL CREATININE-BSD FRML MDRD: >60 ML/MIN/{1.73_M2}
GLUCOSE BLD-MCNC: 102 MG/DL (ref 70–99)
GLUCOSE BLD-MCNC: 99 MG/DL (ref 70–99)
HCT VFR BLD CALC: 29 % (ref 40.5–52.5)
HEMOGLOBIN: 9.6 G/DL (ref 13.5–17.5)
LYMPHOCYTES ABSOLUTE: 1.4 K/UL (ref 1–5.1)
LYMPHOCYTES RELATIVE PERCENT: 17.6 %
MAGNESIUM: 1.9 MG/DL (ref 1.8–2.4)
MCH RBC QN AUTO: 28.6 PG (ref 26–34)
MCHC RBC AUTO-ENTMCNC: 33.1 G/DL (ref 31–36)
MCV RBC AUTO: 86.2 FL (ref 80–100)
MONOCYTES ABSOLUTE: 0.8 K/UL (ref 0–1.3)
MONOCYTES RELATIVE PERCENT: 10.6 %
NEUTROPHILS ABSOLUTE: 5.6 K/UL (ref 1.7–7.7)
NEUTROPHILS RELATIVE PERCENT: 70.6 %
PDW BLD-RTO: 15.6 % (ref 12.4–15.4)
PERFORMED ON: ABNORMAL
PHOSPHORUS: 2.9 MG/DL (ref 2.5–4.9)
PLATELET # BLD: 358 K/UL (ref 135–450)
PMV BLD AUTO: 7.2 FL (ref 5–10.5)
POTASSIUM SERPL-SCNC: 4.1 MMOL/L (ref 3.5–5.1)
RBC # BLD: 3.37 M/UL (ref 4.2–5.9)
SODIUM BLD-SCNC: 133 MMOL/L (ref 136–145)
WBC # BLD: 8 K/UL (ref 4–11)

## 2023-02-21 PROCEDURE — 2580000003 HC RX 258: Performed by: INTERNAL MEDICINE

## 2023-02-21 PROCEDURE — 88341 IMHCHEM/IMCYTCHM EA ADD ANTB: CPT

## 2023-02-21 PROCEDURE — 85025 COMPLETE CBC W/AUTO DIFF WBC: CPT

## 2023-02-21 PROCEDURE — 2709999900 HC NON-CHARGEABLE SUPPLY: Performed by: INTERNAL MEDICINE

## 2023-02-21 PROCEDURE — 3609020000 HC BRONCHOSCOPY W/EBUS FNA: Performed by: INTERNAL MEDICINE

## 2023-02-21 PROCEDURE — 07D78ZX EXTRACTION OF THORAX LYMPHATIC, VIA NATURAL OR ARTIFICIAL OPENING ENDOSCOPIC, DIAGNOSTIC: ICD-10-PCS | Performed by: INTERNAL MEDICINE

## 2023-02-21 PROCEDURE — 2580000003 HC RX 258: Performed by: NURSE ANESTHETIST, CERTIFIED REGISTERED

## 2023-02-21 PROCEDURE — 88172 CYTP DX EVAL FNA 1ST EA SITE: CPT

## 2023-02-21 PROCEDURE — 3700000000 HC ANESTHESIA ATTENDED CARE: Performed by: INTERNAL MEDICINE

## 2023-02-21 PROCEDURE — 31628 BRONCHOSCOPY/LUNG BX EACH: CPT | Performed by: INTERNAL MEDICINE

## 2023-02-21 PROCEDURE — 3603165200 HC BRNCHSC EBUS GUIDED SAMPL 1/2 NODE STATION/STRUX: Performed by: INTERNAL MEDICINE

## 2023-02-21 PROCEDURE — 3609011800 HC BRONCHOSCOPY/TRANSBRONCHIAL LUNG BIOPSY: Performed by: INTERNAL MEDICINE

## 2023-02-21 PROCEDURE — 2060000000 HC ICU INTERMEDIATE R&B

## 2023-02-21 PROCEDURE — 0BB48ZX EXCISION OF RIGHT UPPER LOBE BRONCHUS, VIA NATURAL OR ARTIFICIAL OPENING ENDOSCOPIC, DIAGNOSTIC: ICD-10-PCS | Performed by: INTERNAL MEDICINE

## 2023-02-21 PROCEDURE — 88305 TISSUE EXAM BY PATHOLOGIST: CPT

## 2023-02-21 PROCEDURE — 6370000000 HC RX 637 (ALT 250 FOR IP): Performed by: STUDENT IN AN ORGANIZED HEALTH CARE EDUCATION/TRAINING PROGRAM

## 2023-02-21 PROCEDURE — 2500000003 HC RX 250 WO HCPCS: Performed by: NURSE ANESTHETIST, CERTIFIED REGISTERED

## 2023-02-21 PROCEDURE — 31625 BRONCHOSCOPY W/BIOPSY(S): CPT | Performed by: INTERNAL MEDICINE

## 2023-02-21 PROCEDURE — 88173 CYTOPATH EVAL FNA REPORT: CPT

## 2023-02-21 PROCEDURE — 6360000002 HC RX W HCPCS: Performed by: NURSE ANESTHETIST, CERTIFIED REGISTERED

## 2023-02-21 PROCEDURE — 31652 BRONCH EBUS SAMPLNG 1/2 NODE: CPT | Performed by: INTERNAL MEDICINE

## 2023-02-21 PROCEDURE — 36415 COLL VENOUS BLD VENIPUNCTURE: CPT

## 2023-02-21 PROCEDURE — 80069 RENAL FUNCTION PANEL: CPT

## 2023-02-21 PROCEDURE — 7100000001 HC PACU RECOVERY - ADDTL 15 MIN: Performed by: INTERNAL MEDICINE

## 2023-02-21 PROCEDURE — 2720000010 HC SURG SUPPLY STERILE: Performed by: INTERNAL MEDICINE

## 2023-02-21 PROCEDURE — 88342 IMHCHEM/IMCYTCHM 1ST ANTB: CPT

## 2023-02-21 PROCEDURE — 83735 ASSAY OF MAGNESIUM: CPT

## 2023-02-21 PROCEDURE — 2580000003 HC RX 258: Performed by: STUDENT IN AN ORGANIZED HEALTH CARE EDUCATION/TRAINING PROGRAM

## 2023-02-21 PROCEDURE — 3700000001 HC ADD 15 MINUTES (ANESTHESIA): Performed by: INTERNAL MEDICINE

## 2023-02-21 PROCEDURE — 7100000000 HC PACU RECOVERY - FIRST 15 MIN: Performed by: INTERNAL MEDICINE

## 2023-02-21 RX ORDER — ONDANSETRON 2 MG/ML
INJECTION INTRAMUSCULAR; INTRAVENOUS PRN
Status: DISCONTINUED | OUTPATIENT
Start: 2023-02-21 | End: 2023-02-21 | Stop reason: SDUPTHER

## 2023-02-21 RX ORDER — PROPOFOL 10 MG/ML
INJECTION, EMULSION INTRAVENOUS PRN
Status: DISCONTINUED | OUTPATIENT
Start: 2023-02-21 | End: 2023-02-21 | Stop reason: SDUPTHER

## 2023-02-21 RX ORDER — SODIUM CHLORIDE 0.9 % (FLUSH) 0.9 %
5-40 SYRINGE (ML) INJECTION PRN
Status: DISCONTINUED | OUTPATIENT
Start: 2023-02-21 | End: 2023-02-21 | Stop reason: HOSPADM

## 2023-02-21 RX ORDER — SODIUM CHLORIDE 9 MG/ML
INJECTION, SOLUTION INTRAVENOUS CONTINUOUS PRN
Status: DISCONTINUED | OUTPATIENT
Start: 2023-02-21 | End: 2023-02-21 | Stop reason: SDUPTHER

## 2023-02-21 RX ORDER — SODIUM CHLORIDE 0.9 % (FLUSH) 0.9 %
5-40 SYRINGE (ML) INJECTION EVERY 12 HOURS SCHEDULED
Status: DISCONTINUED | OUTPATIENT
Start: 2023-02-21 | End: 2023-02-21 | Stop reason: HOSPADM

## 2023-02-21 RX ORDER — EPHEDRINE SULFATE 50 MG/ML
INJECTION INTRAVENOUS PRN
Status: DISCONTINUED | OUTPATIENT
Start: 2023-02-21 | End: 2023-02-21 | Stop reason: SDUPTHER

## 2023-02-21 RX ORDER — SODIUM CHLORIDE 9 MG/ML
INJECTION, SOLUTION INTRAVENOUS PRN
Status: DISCONTINUED | OUTPATIENT
Start: 2023-02-21 | End: 2023-02-21 | Stop reason: HOSPADM

## 2023-02-21 RX ORDER — DEXAMETHASONE SODIUM PHOSPHATE 4 MG/ML
INJECTION, SOLUTION INTRA-ARTICULAR; INTRALESIONAL; INTRAMUSCULAR; INTRAVENOUS; SOFT TISSUE PRN
Status: DISCONTINUED | OUTPATIENT
Start: 2023-02-21 | End: 2023-02-21 | Stop reason: SDUPTHER

## 2023-02-21 RX ORDER — SUCCINYLCHOLINE CHLORIDE 20 MG/ML
INJECTION INTRAMUSCULAR; INTRAVENOUS PRN
Status: DISCONTINUED | OUTPATIENT
Start: 2023-02-21 | End: 2023-02-21 | Stop reason: SDUPTHER

## 2023-02-21 RX ORDER — SODIUM CHLORIDE 9 MG/ML
INJECTION, SOLUTION INTRAVENOUS ONCE
Status: COMPLETED | OUTPATIENT
Start: 2023-02-21 | End: 2023-02-21

## 2023-02-21 RX ORDER — ROCURONIUM BROMIDE 10 MG/ML
INJECTION, SOLUTION INTRAVENOUS PRN
Status: DISCONTINUED | OUTPATIENT
Start: 2023-02-21 | End: 2023-02-21 | Stop reason: SDUPTHER

## 2023-02-21 RX ORDER — LIDOCAINE HYDROCHLORIDE 20 MG/ML
INJECTION, SOLUTION INTRAVENOUS PRN
Status: DISCONTINUED | OUTPATIENT
Start: 2023-02-21 | End: 2023-02-21 | Stop reason: SDUPTHER

## 2023-02-21 RX ADMIN — LIDOCAINE HYDROCHLORIDE 60 MG: 20 INJECTION, SOLUTION INTRAVENOUS at 08:17

## 2023-02-21 RX ADMIN — EPHEDRINE SULFATE 10 MG: 50 INJECTION INTRAVENOUS at 08:47

## 2023-02-21 RX ADMIN — SUGAMMADEX 200 MG: 100 INJECTION, SOLUTION INTRAVENOUS at 09:34

## 2023-02-21 RX ADMIN — SUCCINYLCHOLINE CHLORIDE 140 MG: 20 INJECTION, SOLUTION INTRAMUSCULAR; INTRAVENOUS; PARENTERAL at 08:17

## 2023-02-21 RX ADMIN — SODIUM CHLORIDE, PRESERVATIVE FREE 10 ML: 5 INJECTION INTRAVENOUS at 11:21

## 2023-02-21 RX ADMIN — PHENYLEPHRINE HYDROCHLORIDE 100 MCG: 10 INJECTION, SOLUTION INTRAMUSCULAR; INTRAVENOUS; SUBCUTANEOUS at 08:35

## 2023-02-21 RX ADMIN — ONDANSETRON 4 MG: 2 INJECTION INTRAMUSCULAR; INTRAVENOUS at 08:24

## 2023-02-21 RX ADMIN — PHENYLEPHRINE HYDROCHLORIDE 100 MCG: 10 INJECTION, SOLUTION INTRAMUSCULAR; INTRAVENOUS; SUBCUTANEOUS at 08:24

## 2023-02-21 RX ADMIN — EPHEDRINE SULFATE 10 MG: 50 INJECTION INTRAVENOUS at 09:34

## 2023-02-21 RX ADMIN — SODIUM CHLORIDE: 9 INJECTION, SOLUTION INTRAVENOUS at 08:03

## 2023-02-21 RX ADMIN — ROCURONIUM BROMIDE 10 MG: 10 INJECTION INTRAVENOUS at 08:17

## 2023-02-21 RX ADMIN — SODIUM CHLORIDE, PRESERVATIVE FREE 10 ML: 5 INJECTION INTRAVENOUS at 21:00

## 2023-02-21 RX ADMIN — PHENYLEPHRINE HYDROCHLORIDE 200 MCG: 10 INJECTION, SOLUTION INTRAMUSCULAR; INTRAVENOUS; SUBCUTANEOUS at 08:43

## 2023-02-21 RX ADMIN — PHENYLEPHRINE HYDROCHLORIDE 100 MCG: 10 INJECTION, SOLUTION INTRAMUSCULAR; INTRAVENOUS; SUBCUTANEOUS at 08:29

## 2023-02-21 RX ADMIN — PHENYLEPHRINE HYDROCHLORIDE 200 MCG: 10 INJECTION, SOLUTION INTRAMUSCULAR; INTRAVENOUS; SUBCUTANEOUS at 08:34

## 2023-02-21 RX ADMIN — EPHEDRINE SULFATE 10 MG: 50 INJECTION INTRAVENOUS at 09:23

## 2023-02-21 RX ADMIN — EPHEDRINE SULFATE 10 MG: 50 INJECTION INTRAVENOUS at 09:01

## 2023-02-21 RX ADMIN — DEXAMETHASONE SODIUM PHOSPHATE 4 MG: 4 INJECTION, SOLUTION INTRAMUSCULAR; INTRAVENOUS at 08:24

## 2023-02-21 RX ADMIN — EPHEDRINE SULFATE 10 MG: 50 INJECTION INTRAVENOUS at 08:52

## 2023-02-21 RX ADMIN — PROPOFOL 180 MG: 10 INJECTION, EMULSION INTRAVENOUS at 08:17

## 2023-02-21 RX ADMIN — PHENYLEPHRINE HYDROCHLORIDE 200 MCG: 10 INJECTION, SOLUTION INTRAMUSCULAR; INTRAVENOUS; SUBCUTANEOUS at 08:40

## 2023-02-21 RX ADMIN — AMLODIPINE BESYLATE 5 MG: 5 TABLET ORAL at 11:19

## 2023-02-21 RX ADMIN — SODIUM CHLORIDE: 9 INJECTION, SOLUTION INTRAVENOUS at 08:07

## 2023-02-21 RX ADMIN — ROCURONIUM BROMIDE 30 MG: 10 INJECTION INTRAVENOUS at 08:30

## 2023-02-21 ASSESSMENT — PAIN DESCRIPTION - DESCRIPTORS: DESCRIPTORS: TIGHTNESS;OTHER (COMMENT)

## 2023-02-21 NOTE — PROGRESS NOTES
PACU Transfer Note    Vitals:    02/21/23 1045   BP: 120/87   Pulse: 96   Resp: 26   Temp: 98 °F (36.7 °C)   SpO2: 100%       In: 835 [P.O.:60; I.V.:775]  Out: 105 [Urine:100]    Pain assessment:  none  Pain Level: 2    Report given to Receiving unit RN.    2/21/2023 10:48 AM

## 2023-02-21 NOTE — FLOWSHEET NOTE
Pt sitting up tolerating ice chips and crackers. Tried to call report to [employer] and unable to take report at this time.

## 2023-02-21 NOTE — PROGRESS NOTES
Progress Note    Admit Date: 2/17/2023  Diet: Regular diet    CC: leg swelling    Interval history: Patient was resting in bed this morning. He reported feeling fine, with no worsening of any joint swelling or pain. His knee pain feels the same as yesterday. He is not having shortness of breath, chest pain, or cough. HPI: 44 yo M with HTN, tobacco and aclohol use present to ED with lower extremity swelling since Dec 2022. Tried PTC diuretics which helped. Swelling progressed to his lower and upper extremity. 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 Hg 10.4 (down from 15 in 2019). CXR with large RT UL mass, CR chest abdomen pelvis with con demonstrated a large right upper lobe and right hilar mass suspicious for malignancy. Severe emphysema. No evidence of PE. There was no sign of any metastatic disease in the abdomen or pelvis.  Admitted for further workup of RUL mass    Medications:     Scheduled Meds:   enoxaparin  40 mg SubCUTAneous Daily    amLODIPine  5 mg Oral Daily    sodium chloride flush  5-40 mL IntraVENous 2 times per day    sodium chloride flush  5-40 mL IntraVENous 2 times per day    nicotine  1 patch TransDERmal Daily     Continuous Infusions:   sodium chloride      sodium chloride      dextrose       PRN Meds:LORazepam **OR** LORazepam **OR** LORazepam **OR** LORazepam **OR** LORazepam **OR** LORazepam **OR** LORazepam **OR** LORazepam, perflutren lipid microspheres, ibuprofen, sodium chloride flush, sodium chloride, ondansetron **OR** ondansetron, polyethylene glycol, acetaminophen **OR** acetaminophen, sodium chloride flush, sodium chloride, glucose, dextrose bolus **OR** dextrose bolus, glucagon (rDNA), dextrose, labetalol, hydrALAZINE    Objective:   Vitals:   T-max:  Patient Vitals for the past 8 hrs:   BP Temp Temp src Pulse Resp SpO2   02/21/23 1057 113/75 97.4 °F (36.3 °C) Oral 100 18 96 %   02/21/23 1045 120/87 98 °F (36.7 °C) Temporal 96 26 100 %   02/21/23 1042 -- -- -- 98 -- --   02/21/23 1030 117/82 -- -- 98 20 97 %   02/21/23 1015 115/80 -- -- 95 21 100 %   02/21/23 1005 116/85 -- -- 94 23 100 %   02/21/23 1000 117/85 -- -- 97 24 98 %   02/21/23 0955 117/78 -- -- 98 15 96 %   02/21/23 0951 120/79 98.2 °F (36.8 °C) Temporal 100 20 96 %   02/21/23 0750 130/81 98.1 °F (36.7 °C) Infrared 97 18 98 %   02/21/23 0706 122/74 97.8 °F (36.6 °C) Oral 98 20 99 %   02/21/23 0436 112/72 98.4 °F (36.9 °C) Oral (!) 117 19 98 %         Intake/Output Summary (Last 24 hours) at 2/21/2023 1128  Last data filed at 2/21/2023 1047  Gross per 24 hour   Intake 1195 ml   Output 505 ml   Net 690 ml     Physical Exam  Constitutional:       General: He is not in acute distress. Appearance: Normal appearance. He is ill-appearing. He is not toxic-appearing or diaphoretic. Comments: Appears emaciated   Eyes:      Pupils: Pupils are equal, round, and reactive to light. Cardiovascular:      Rate and Rhythm: Regular rhythm. Tachycardia present. Pulses: Normal pulses. Heart sounds: Normal heart sounds. No murmur heard. Pulmonary:      Effort: Pulmonary effort is normal. No respiratory distress. Breath sounds: Decreased breath sounds present. Abdominal:      General: Bowel sounds are normal. There is no distension. Palpations: Abdomen is soft. Tenderness: There is no abdominal tenderness. There is no guarding. Musculoskeletal:         General: Swelling present. Comments: Swelling b/l upper extremities improved from prior exam, mildly tender to palpation of bilateral UE. Minimal swelling in lower extremities. Tender to palpation of b/l LE especially knees.  Range of motion intact and improving in bilateral hands/fingers  Clubbing of fingers bilaterally. Neurological:      Mental Status: He is alert. Mental status is at baseline. LABS:  CBC:   Recent Labs     02/19/23  0407 02/20/23 0345 02/21/23 0347   WBC 9.1 8.6 8.0   HGB 9.5* 9.7* 9.6*   HCT 29.4* 30.3* 29.0*    388 358   MCV 86.8 87.4 86.2     Renal:    Recent Labs     02/19/23 0407 02/20/23 0345 02/21/23 0347    133* 133*   K 3.7 3.8 4.1    98* 98*   CO2 27 27 28   BUN 7 11 11   CREATININE <0.5* <0.5* <0.5*   GLUCOSE 105* 101* 99   CALCIUM 9.3 9.0 9.3   MG 2.00 2.00 1.90   PHOS 2.7 2.8 2.9   ANIONGAP 8 8 7     Hepatic:   Recent Labs     02/18/23  1308 02/19/23 0407 02/20/23 0345 02/21/23 0347   AST 28  --   --   --    ALT 30  --   --   --    BILITOT 0.3  --   --   --    BILIDIR <0.2  --   --   --    PROT 6.3*  --   --   --    LABALBU 2.7* 2.7* 2.6* 2.6*   ALKPHOS 155*  --   --   --      Troponin:   No results for input(s): TROPONINI in the last 72 hours. BNP: No results for input(s): BNP in the last 72 hours. Lipids: No results for input(s): CHOL, HDL in the last 72 hours. Invalid input(s): LDLCALCU, TRIGLYCERIDE  ABGs:  No results for input(s): PHART, ABO4DRU, PO2ART, OUU4ZXC, BEART, THGBART, U9BFLTSW, NIX4OLO in the last 72 hours. INR: No results for input(s): INR in the last 72 hours. Lactate: No results for input(s): LACTATE in the last 72 hours. Cultures:  -----------------------------------------------------------------  RAD:   MRI BRAIN W WO CONTRAST   Final Result      1. No acute intracranial abnormality. No evidence of intracranial metastatic disease. CT CHEST PULMONARY EMBOLISM W CONTRAST   Final Result      Chest:   1. Large right upper lobe and right hilar mass consistent with primary lung cancer. 2.  Mild subcarinal and pretracheal lymphadenopathy may represent metastatic disease. 3.  Severe emphysema. 4.  No evidence of pulmonary embolism.  Right upper lobe mass encases and narrows right upper lobe pulmonary artery branches as well as bronchioles. Abdomen and pelvis:   1. Small indeterminate left adrenal nodule. 2.  Otherwise, no evidence of metastatic disease in the abdomen and pelvis. 3.  Note that the study is limited due to paucity of intra-abdominal fat/cachexia. CT ABDOMEN PELVIS W IV CONTRAST Additional Contrast? Oral   Final Result      Chest:   1. Large right upper lobe and right hilar mass consistent with primary lung cancer. 2.  Mild subcarinal and pretracheal lymphadenopathy may represent metastatic disease. 3.  Severe emphysema. 4.  No evidence of pulmonary embolism. Right upper lobe mass encases and narrows right upper lobe pulmonary artery branches as well as bronchioles. Abdomen and pelvis:   1. Small indeterminate left adrenal nodule. 2.  Otherwise, no evidence of metastatic disease in the abdomen and pelvis. 3.  Note that the study is limited due to paucity of intra-abdominal fat/cachexia. XR CHEST (2 VW)   Final Result      Large right upper lobe mass likely representing primary malignancy. Recommend CT chest or chest/abdomen/pelvis with contrast for further evaluation. Assessment/Plan:     Right upper lobe lung mass  Tobacco use disorder  Unintentional weight loss, night sweats  Extreme fatigue  Peripheral edema  Anemia  Patient presented to ED 2/17 with leg swelling, weight loss, found to have 6 x 8 cm right upper lung mass. MRI brain 2/19 no acute intracranial abnormality, no evidence of intracranial metastatic disease.  Iron/anemia profile consistent with anemia of chronic disease, likely secondary to malignancy.   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 Historically low BMI per chart (16.68 in 2014).    -Dietician evaluation - Severe malnutrition   -Start Ensure +HP BID  -BMI 16.2, down from 16.7 on admission  -Regular diet s/p procedure today    Hypertension  -Well-controlled w/o use of PRN labetalol/hydralazine  -Continue norvasc 5mg QD      Code Status:Full code  FEN: Regular diet with ONS  PPX: Lovenox  DISPO: Jael Devries, MS3  02/21/23  11:28 AM    Marvin Schwartz MD Internal Medicine PGY-1  2/21/2023  11:50 AM

## 2023-02-21 NOTE — PROGRESS NOTES
Patient admitted to PACU # 10 from OR at 5318 post BRONCHOSCOPY ENDOBRONCHIAL ULTRASOUND; WITH TRANSBRONCHIAL LUNG BIOPSY, BRONCHOSCOPY W/EBUS FNA per Dr. Km Charles. Attached to PACU monitoring system and report received from anesthesia provider. Patient was reported to be hemodynamically stable during procedure. Patient arrived to pacu awake and on 3 L NC. PT NSR on monitor. Pt c/o hungry. Dr. Km Charles at bedside and said pt can eat.

## 2023-02-21 NOTE — ANESTHESIA PRE PROCEDURE
Department of Anesthesiology  Preprocedure Note       Name:  Bhakti Mo   Age:  45 y.o.  :  1970                                          MRN:  [id number]         Date:  2023      Surgeon: Jesus Chu):  Stefania Love MD    Procedure: Procedure(s):  BRONCHOSCOPY ENDOBRONCHIAL ULTRASOUND;  WITH TRANSBRONCHIAL LUNG BIOPSY    Medications prior to admission:   Prior to Admission medications    Not on File       Current medications:    Current Facility-Administered Medications   Medication Dose Route Frequency Provider Last Rate Last Admin    LORazepam (ATIVAN) tablet 1 mg  1 mg Oral Q1H PRN Juan R Peña MD        Or    LORazepam (ATIVAN) injection 1 mg  1 mg IntraVENous Q1H PRN Juan R Peña MD        Or    LORazepam (ATIVAN) tablet 2 mg  2 mg Oral Q1H PRN Juan R Peña MD        Or    LORazepam (ATIVAN) injection 2 mg  2 mg IntraVENous Q1H PRN Juan R Peña MD        Or    LORazepam (ATIVAN) tablet 3 mg  3 mg Oral Q1H PRN Juan R Peña MD        Or    LORazepam (ATIVAN) injection 3 mg  3 mg IntraVENous Q1H PRN Juan R Peña MD        Or    LORazepam (ATIVAN) tablet 4 mg  4 mg Oral Q1H PRN Juan R Peña MD        Or    LORazepam (ATIVAN) injection 4 mg  4 mg IntraVENous Q1H PRN Juan R Peña MD        enoxaparin (LOVENOX) injection 40 mg  40 mg SubCUTAneous Daily Jatin Gastelum MD        amLODIPine (NORVASC) tablet 5 mg  5 mg Oral Daily Christopher Gillespie MD   5 mg at 23 1328    perflutren lipid microspheres (DEFINITY) injection 1.5 mL  1.5 mL IntraVENous ONCE PRN Christopher Gillespie MD        ibuprofen (ADVIL;MOTRIN) tablet 400 mg  400 mg Oral Q8H PRN Daksha Pandya MD        sodium chloride flush 0.9 % injection 5-40 mL  5-40 mL IntraVENous 2 times per day Gia Vera MD   10 mL at 23 2121    sodium chloride flush 0.9 % injection 5-40 mL  5-40 mL IntraVENous PRN Gia Vera MD        0.9 % sodium chloride infusion   IntraVENous PRN Rabia Pereyra MD        ondansetron (ZOFRAN-ODT) disintegrating tablet 4 mg  4 mg Oral Q8H PRN Rabia Pereyra MD        Or    ondansetron [facility]) injection 4 mg  4 mg IntraVENous Q6H PRN Rabia Pereyra MD        polyethylene glycol [facility]) packet 17 g  17 g Oral Daily PRN Rabia Pereyra MD        acetaminophen (TYLENOL) tablet 650 mg  650 mg Oral Q6H PRN Rabia Pereyra MD        Or    acetaminophen (TYLENOL) suppository 650 mg  650 mg Rectal Q6H PRN Rabia Pereyra MD        sodium chloride flush 0.9 % injection 5-40 mL  5-40 mL IntraVENous 2 times per day Rabia Pereyra MD   10 mL at 02/20/23 2121    sodium chloride flush 0.9 % injection 5-40 mL  5-40 mL IntraVENous PRN Rabia Pereyra MD        0.9 % sodium chloride infusion   IntraVENous PRN Rabia Pereyra MD        thiamine mononitrate tablet 100 mg  100 mg Oral Daily Rabia Pereyra MD   100 mg at 02/20/23 5013    nicotine (NICODERM CQ) 14 MG/24HR 1 patch  1 patch TransDERmal Daily Rabia Pereyra MD        glucose chewable tablet 16 g  4 tablet Oral PRN Rabia Pereyra MD        dextrose bolus 10% 125 mL  125 mL IntraVENous PRN Rabia Pereyra MD        Or    dextrose bolus 10% 250 mL  250 mL IntraVENous PRN Rabia Pereyra MD        glucagon injection 1 mg  1 mg SubCUTAneous PRN Rabia Pereyra MD        dextrose 10 % infusion   IntraVENous Continuous PRN Rabia Pereyra MD        labetalol (NORMODYNE;TRANDATE) injection 5 mg  5 mg IntraVENous Q4H PRN Rabia Pereyra MD        hydrALAZINE (APRESOLINE) injection 5 mg  5 mg IntraVENous Q4H PRHASEEB Pereyra MD           Allergies:  No Known Allergies    Problem List:    Patient Active Problem List   Diagnosis Code    Lung mass R91.8    Severe malnutrition ([facility] [address] E43       Past Medical History:        Diagnosis Date    Hypertension        Past Surgical History:  No past surgical history on file.     Social History:    Social History     Tobacco Use    Smoking status: Some Days Packs/day: 0.50     Types: Cigarettes    Smokeless tobacco: Never   Substance Use Topics    Alcohol use: Yes     Comment: beer- weekends                                Ready to quit: Not Answered  Counseling given: Not Answered      Vital Signs (Current):   Vitals:    02/20/23 2356 02/21/23 0436 02/21/23 0706 02/21/23 0750   BP: 127/76 112/72 122/74 130/81   Pulse: 95 (!) 117 98 97   Resp:  19 20 18   Temp: 98.8 °F (37.1 °C) 98.4 °F (36.9 °C) 97.8 °F (36.6 °C) 98.1 °F (36.7 °C)   TempSrc: Oral Oral Oral Infrared   SpO2: 97% 98% 99% 98%   Weight:       Height:                                                  BP Readings from Last 3 Encounters:   02/21/23 130/81   04/20/19 (!) 154/87       NPO Status: Time of last liquid consumption: 0000                        Time of last solid consumption: 1600                        Date of last liquid consumption: 02/21/23                        Date of last solid food consumption: 02/20/23    BMI:   Wt Readings from Last 3 Encounters:   02/20/23 122 lb 6.4 oz (55.5 kg)     Body mass index is 16.15 kg/m².     CBC:   Lab Results   Component Value Date/Time    WBC 8.0 02/21/2023 03:47 AM    RBC 3.37 02/21/2023 03:47 AM    HGB 9.6 02/21/2023 03:47 AM    HCT 29.0 02/21/2023 03:47 AM    MCV 86.2 02/21/2023 03:47 AM    RDW 15.6 02/21/2023 03:47 AM     02/21/2023 03:47 AM       CMP:   Lab Results   Component Value Date/Time     02/21/2023 03:47 AM    K 4.1 02/21/2023 03:47 AM    K 4.2 02/17/2023 06:34 PM    CL 98 02/21/2023 03:47 AM    CO2 28 02/21/2023 03:47 AM    BUN 11 02/21/2023 03:47 AM    CREATININE <0.5 02/21/2023 03:47 AM    GFRAA >60 04/20/2019 07:01 PM    LABGLOM >60 02/21/2023 03:47 AM    GLUCOSE 99 02/21/2023 03:47 AM    PROT 6.3 02/18/2023 01:08 PM    CALCIUM 9.3 02/21/2023 03:47 AM    BILITOT 0.3 02/18/2023 01:08 PM    ALKPHOS 155 02/18/2023 01:08 PM    AST 28 02/18/2023 01:08 PM    ALT 30 02/18/2023 01:08 PM       POC Tests:   Recent Labs     02/21/23  6915 POCGLU 102*       Coags: No results found for: PROTIME, INR, APTT    HCG (If Applicable): No results found for: PREGTESTUR, PREGSERUM, HCG, HCGQUANT     ABGs: No results found for: PHART, PO2ART, YYT9WXL, RKF3LVU, BEART, K7RQXNGP     Type & Screen (If Applicable):  No results found for: LABABO, LABRH    Drug/Infectious Status (If Applicable):  No results found for: HIV, HEPCAB    COVID-19 Screening (If Applicable):   Lab Results   Component Value Date/Time    COVID19 NOT DETECTED 02/18/2023 05:33 PM           Anesthesia Evaluation  Patient summary reviewed  Airway: Mallampati: III  TM distance: >3 FB   Neck ROM: full  Mouth opening: > = 3 FB   Dental:      Comment: Many missing    Pulmonary:   (+) decreased breath sounds                           ROS comment: Lung mass   Cardiovascular:  Exercise tolerance: no interval change,   (+) hypertension:,             Echocardiogram reviewed               ROS comment: ECHO 2/20/23 55-60%     Neuro/Psych:   (+) neuromuscular disease:,             GI/Hepatic/Renal:             Endo/Other:                     Abdominal:             Vascular: Other Findings:           Anesthesia Plan      general     ASA 3       Induction: intravenous. Anesthetic plan and risks discussed with patient. Plan discussed with CRNA.     Attending anesthesiologist reviewed and agrees with Nayla Renee MD   2/21/2023

## 2023-02-21 NOTE — ANESTHESIA POSTPROCEDURE EVALUATION
Department of Anesthesiology  Postprocedure Note    Patient: Liz Guajardo  MRN: [id number]  YOB: 1970  Date of evaluation: 2/21/2023      Procedure Summary     Date: 02/21/23 Room / Location: [place] [address] / [facility]    Anesthesia Start: 0809 Anesthesia Stop: 8493    Procedures:       BRONCHOSCOPY ENDOBRONCHIAL ULTRASOUND;      WITH TRANSBRONCHIAL LUNG BIOPSY      BRONCHOSCOPY W/EBUS FNA Diagnosis:       Lung mass      (Lung mass [R91.8])    Surgeons: Hetal Law MD Responsible Provider: Kenzie Cantu MD    Anesthesia Type: general ASA Status: 3          Anesthesia Type: No value filed.     Sarah Phase I: Sarah Score: 10    Sarah Phase II:        Anesthesia Post Evaluation    Patient location during evaluation: PACU  Patient participation: complete - patient participated  Level of consciousness: awake  Pain score: 0  Airway patency: patent  Nausea & Vomiting: no nausea  Complications: no  Cardiovascular status: hemodynamically stable  Respiratory status: acceptable  Hydration status: stable

## 2023-02-21 NOTE — PROCEDURES
PROCEDURE: Fiberoptic bronchoscopy with endobronchial ultrasound-guided biopsy of lymph nodes    DESCRIPTION OF PROCEDURE: Informed consent was obtained from the patient after explaining the risks and benefits. A time out was taken. Total intravenous anesthesia was kindly provided by the anesthetist.     Initially a standard bronchoscope was used to perform a complete airway inspection. The lower trachea appeared normal.  The bronchial trees were examined to the segmental level. RUL bronchus was narrowed with abnormal irregular mucosa extending down to the segmental bronchi with abnormal tortuous blood vessels. Next the EBUS scope was passed with ease via the ETT. Direct visualization of the lymph nodes was obtained using endobronchial ultrasound (EBUS) guidance. Station 7 node nodes were subjected to EBUS guided biopsy using standard technique approached from the right side. Four slides were prepared. While waiting for the results standard scope was inserted into the airway via ETT, and several endobronchial and transbronchial biopsies were obtained from the RUL bronchus and anterior and posterior segments. Preliminary results from station 7R were consistent of lymphoid tissue, there was no definite malignancy, therefore 2nd set of four biopsies were obtained from station 7 node approached from the left side, plus addition needle biopsy placed in whole to CytoLyt. This was followed four additional transbronchial CoreDx forceps biopsies from station 7 LN placed in separate formalin container. The standard bronchscope was re-inserted passed the vocal cords and into the trachea via the ETT  Bilateral wash was done. There was good hemostasis. The patient tolerated the procedure well.  Recovery will be per the anesthesia team.        EBL: less than 10 mL     Rosemary Gordon MD

## 2023-02-21 NOTE — PLAN OF CARE
Problem: Safety - Adult  Goal: Free from fall injury  2/21/2023 1713 by Candido Ramirez RN  Outcome: Progressing- Patient is a standby assist to and from bathroom, bed/chair alarm on, call light in reach. Pt calls out appropriately for assistance. Will continue to monitor. Problem: Cardiovascular - Adult  Goal: Absence of cardiac dysrhythmias or at baseline  Outcome: Progressing- NSR to ST this shift, no abnormal rhythms noted. BP stable. Will continue to monitor. Problem: Respiratory - Adult  Goal: Achieves optimal ventilation and oxygenation  2/21/2023 1713 by Candido Ramirez RN  Outcome: Progressing- Patient remains on RA, no need for oxygen at this time. Will continue to monitor.

## 2023-02-21 NOTE — CARE COORDINATION
Case Management Assessment  Initial Evaluation    Date/Time of Evaluation: 2/21/2023 3:37 PM  Assessment Completed by: DAWSON Beltran, PRO    If patient is discharged prior to next notation, then this note serves as note for discharge by case management. Patient Name: Ricardo Jimenez                   YOB: 1970  Diagnosis: Lung mass [R91.8]  Malignant neoplasm of right lung, unspecified part of lung ([facility] [address] [C34.91]                   Date / Time: 2/17/2023  6:25 PM    Patient Admission Status: Inpatient   Readmission Risk (Low < 19, Mod (19-27), High > 27): Readmission Risk Score: 11.7    Current PCP: Pcp No  PCP verified by CM? Chart Reviewed: Yes      History Provided by: Patient  Patient Orientation: Alert and Oriented    Patient Cognition: Alert    Hospitalization in the last 30 days (Readmission):  No    If yes, Readmission Assessment in CM Navigator will be completed. Advance Directives:      Code Status: Full Code   Patient's Primary Decision Maker is: Legal Next of Kin      Discharge Planning:    Patient lives with: Alone Type of Home: Apartment  Primary Care Giver: Self  Patient Support Systems include: Family Members   Current Financial resources: Medicaid  Current community resources:    Current services prior to admission: None            Current DME:              Type of Home Care services:  None    ADLS  Prior functional level: Independent in ADLs/IADLs  Current functional level: Independent in ADLs/IADLs    PT AM-PAC: 19 /24  OT AM-PAC: 23 /24    Family can provide assistance at DC: Yes  Would you like Case Management to discuss the discharge plan with any other family members/significant others, and if so, who?  No  Plans to Return to Present Housing: Yes  Other Identified Issues/Barriers to RETURNING to current housing: medical stability   Potential Assistance needed at discharge: Prescription Assistance            Potential DME:    Patient expects to discharge to: Apartment  Plan for transportation at discharge: Family    Financial    Payor: PENDING MEDICAID / Plan: PENDING MEDICAID / Product Type: *No Product type* /     Does insurance require precert for SNF: No    Potential assistance Purchasing Medications: Yes  Meds-to-Beds request:        [id number] [place] [address] [place] [phone] - F [phone]  [address] [facility] [address]  Phone: [phone] Fax: [phone]      Notes:    Factors facilitating achievement of predicted outcomes: Motivated, Cooperative, Pleasant, and Sense of humor    Barriers to discharge: Medical complications    Additional Case Management Notes:       SW met w/Pt at bedside w/Pt. SW provide [employer] and how to apply for [employer]. SW answered all of pt's questions regarding resources. Pt will need a med voucher/meds to beds. Eligio Szymanski in pub benefits submitted [place]icaid [place] for pt. Pt's family will transport him home. The Plan for Transition of Care is related to the following treatment goals of Lung mass [R91.8]  Malignant neoplasm of right lung, unspecified part of lung ([address] [K71.66]    IF APPLICABLE: The Patient and/or patient representative Zhane Burleson and his family were provided with a choice of provider and agrees with the discharge plan. Freedom of choice list with basic dialogue that supports the patient's individualized plan of care/goals and shares the quality data associated with the providers was provided to: Patient   Patient Representative Name:       The Patient and/or Patient Representative Agree with the Discharge Plan?  Yes    DAWSON Dejesus, [facility]  Case Management Department  Ph: [phone]

## 2023-02-21 NOTE — PLAN OF CARE
Problem: Safety - Adult  Goal: Free from fall injury  2/21/2023 0506 by Brissa Trammell RN  Outcome: Progressing  Flowsheets (Taken 2/21/2023 0506)  Free From Fall Injury:   Instruct family/caregiver on patient safety   Based on caregiver fall risk screen, instruct family/caregiver to ask for assistance with transferring infant if caregiver noted to have fall risk factors     Problem: Cardiovascular - Adult  Goal: Maintains optimal cardiac output and hemodynamic stability  2/21/2023 0506 by Brissa Trammell RN  Outcome: Progressing  Flowsheets (Taken 2/21/2023 0506)  Maintains optimal cardiac output and hemodynamic stability:   Monitor blood pressure and heart rate   Monitor urine output and notify Licensed Independent Practitioner for values outside of normal range   Assess for signs of decreased cardiac output   Administer fluid and/or volume expanders as ordered   Administer vasoactive medications as ordered   For PPHN infants, administer sedation as ordered and minimize all controllable stressors.      Problem: Respiratory - Adult  Goal: Achieves optimal ventilation and oxygenation  2/21/2023 0506 by Brissa Trammell RN  Outcome: Progressing  Flowsheets (Taken 2/21/2023 0506)  Achieves optimal ventilation and oxygenation:   Assess for changes in respiratory status   Position to facilitate oxygenation and minimize respiratory effort   Initiate smoking cessation protocol as indicated   Respiratory therapy support as indicated   Assess the need for suctioning and aspirate as needed   Assess and instruct to report shortness of breath or any respiratory difficulty   Encourage broncho-pulmonary hygiene including cough, deep breathe, incentive spirometry   Oxygen supplementation based on oxygen saturation or arterial blood gases   Assess for changes in mentation and behavior

## 2023-02-22 VITALS
SYSTOLIC BLOOD PRESSURE: 145 MMHG | RESPIRATION RATE: 20 BRPM | HEIGHT: 73 IN | WEIGHT: 127.87 LBS | DIASTOLIC BLOOD PRESSURE: 76 MMHG | TEMPERATURE: 98.4 F | OXYGEN SATURATION: 99 % | HEART RATE: 105 BPM | BODY MASS INDEX: 16.95 KG/M2

## 2023-02-22 LAB
ALBUMIN SERPL-MCNC: 2.8 G/DL (ref 3.4–5)
ANION GAP SERPL CALCULATED.3IONS-SCNC: 7 MMOL/L (ref 3–16)
BASOPHILS ABSOLUTE: 0 K/UL (ref 0–0.2)
BASOPHILS RELATIVE PERCENT: 0.4 %
BUN BLDV-MCNC: 10 MG/DL (ref 7–20)
CALCIUM SERPL-MCNC: 9.5 MG/DL (ref 8.3–10.6)
CHLORIDE BLD-SCNC: 102 MMOL/L (ref 99–110)
CO2: 28 MMOL/L (ref 21–32)
CREAT SERPL-MCNC: 0.5 MG/DL (ref 0.9–1.3)
EOSINOPHILS ABSOLUTE: 0.1 K/UL (ref 0–0.6)
EOSINOPHILS RELATIVE PERCENT: 0.8 %
GFR SERPL CREATININE-BSD FRML MDRD: >60 ML/MIN/{1.73_M2}
GLUCOSE BLD-MCNC: 135 MG/DL (ref 70–99)
HCT VFR BLD CALC: 28.8 % (ref 40.5–52.5)
HEMOGLOBIN: 9.4 G/DL (ref 13.5–17.5)
LYMPHOCYTES ABSOLUTE: 1.7 K/UL (ref 1–5.1)
LYMPHOCYTES RELATIVE PERCENT: 17.2 %
MAGNESIUM: 1.9 MG/DL (ref 1.8–2.4)
MCH RBC QN AUTO: 28.2 PG (ref 26–34)
MCHC RBC AUTO-ENTMCNC: 32.7 G/DL (ref 31–36)
MCV RBC AUTO: 86.1 FL (ref 80–100)
MONOCYTES ABSOLUTE: 0.9 K/UL (ref 0–1.3)
MONOCYTES RELATIVE PERCENT: 9.5 %
NEUTROPHILS ABSOLUTE: 7.1 K/UL (ref 1.7–7.7)
NEUTROPHILS RELATIVE PERCENT: 72.1 %
PDW BLD-RTO: 15.7 % (ref 12.4–15.4)
PHOSPHORUS: 2.8 MG/DL (ref 2.5–4.9)
PLATELET # BLD: 359 K/UL (ref 135–450)
PMV BLD AUTO: 7.2 FL (ref 5–10.5)
POTASSIUM SERPL-SCNC: 4.1 MMOL/L (ref 3.5–5.1)
RBC # BLD: 3.35 M/UL (ref 4.2–5.9)
SODIUM BLD-SCNC: 137 MMOL/L (ref 136–145)
WBC # BLD: 9.8 K/UL (ref 4–11)

## 2023-02-22 PROCEDURE — 2580000003 HC RX 258: Performed by: STUDENT IN AN ORGANIZED HEALTH CARE EDUCATION/TRAINING PROGRAM

## 2023-02-22 PROCEDURE — 6370000000 HC RX 637 (ALT 250 FOR IP): Performed by: STUDENT IN AN ORGANIZED HEALTH CARE EDUCATION/TRAINING PROGRAM

## 2023-02-22 PROCEDURE — 97535 SELF CARE MNGMENT TRAINING: CPT

## 2023-02-22 PROCEDURE — 80069 RENAL FUNCTION PANEL: CPT

## 2023-02-22 PROCEDURE — 97116 GAIT TRAINING THERAPY: CPT

## 2023-02-22 PROCEDURE — 97530 THERAPEUTIC ACTIVITIES: CPT

## 2023-02-22 PROCEDURE — 99231 SBSQ HOSP IP/OBS SF/LOW 25: CPT | Performed by: INTERNAL MEDICINE

## 2023-02-22 PROCEDURE — 85025 COMPLETE CBC W/AUTO DIFF WBC: CPT

## 2023-02-22 PROCEDURE — 83735 ASSAY OF MAGNESIUM: CPT

## 2023-02-22 RX ORDER — AMLODIPINE BESYLATE 5 MG/1
5 TABLET ORAL DAILY
Qty: 30 TABLET | Refills: 3 | Status: SHIPPED | OUTPATIENT
Start: 2023-02-23

## 2023-02-22 RX ADMIN — SODIUM CHLORIDE, PRESERVATIVE FREE 10 ML: 5 INJECTION INTRAVENOUS at 08:02

## 2023-02-22 RX ADMIN — AMLODIPINE BESYLATE 5 MG: 5 TABLET ORAL at 08:01

## 2023-02-22 NOTE — PROGRESS NOTES
Shift assessment complete; see flow sheet. During assessment pt alert and oriented x4. Writer inquired about amount of alcoholic drinks pt usually has per day; pt responded with \"I haven't drank since I was a young boy\" pt stated that everyone has been asking him and his answer hasn't changed. Writer explained the reasoning for the questioning, pt understood. Pt was able to ambulate to the bathroom with SB assistance, pt definitely unsteady on his feet. VSS. Writer will continue to monitor. 0000: Shift assessment complete; see flow sheet. No changes noted. 0400: Pt appears to be sleeping comfortably. No changes noted.

## 2023-02-22 NOTE — CONSULTS
[facility]    Consult Note      Requesting Physician:  Evens Rapp MD    CHIEF COMPLAINT:  leg swelling       HISTORY OF PRESENT ILLNESS:    Mr. Nile Braswell  is a 45 y.o. male we are seeing in consultation for     ICD-10-CM    1. Malignant neoplasm of right lung, unspecified part of lung ([facility] [address]  C34.91       2. Lung mass  R91.8 Surgical Pathology     Surgical Pathology     Surgical Pathology     Surgical Pathology         He presented to the ED with c/o 2 month hx of LE edema. He had been managing with Diurex. This progressed to edema in his upper extremities and he decided to be evaluated. He was noted to have fatigue, night sweats,  and unintentional weight loss of <10 lbs. Chest x-ray demonstrated a large right upper lobe mass. CT chest abdomen pelvis with contrast demonstrated a large right upper lobe and right hilar mass suspicious for malignancy. He is a 1.5 ppd smoker. He is s/p bronchoscopy and biopsy yesterday 2/21/23. He has a very good understanding of his situation. He lives alone in [facility]. He has family that lives in the same building. He does not have insurance currently but notes that  has helped him with an application for medicaid. He is interested in pursuing disability and has questions about this. He states he will be homeless soon as he is unable to work and he is running out of money.      Past Medical History:  Past Medical History:   Diagnosis Date    Hypertension        Past Surgical History:  Past Surgical History:   Procedure Laterality Date    BRONCHOSCOPY N/A 2/21/2023    BRONCHOSCOPY ENDOBRONCHIAL ULTRASOUND; performed by Isaías Mccollum MD at [address] N/A 2/21/2023    WITH TRANSBRONCHIAL LUNG BIOPSY performed by Isaías Mccollum MD at [address]  2/21/2023    BRONCHOSCOPY W/EBUS FNA performed by Cesar Rowe MD at [facility] ENDOSCOPY       Current Medications:  Current Facility-Administered Medications   Medication Dose Route Frequency Provider Last Rate Last Admin    LORazepam (ATIVAN) tablet 1 mg  1 mg Oral Q1H PRN Juan R Peña MD        Or    LORazepam (ATIVAN) injection 1 mg  1 mg IntraVENous Q1H PRN Juan R Peña MD        Or    LORazepam (ATIVAN) tablet 2 mg  2 mg Oral Q1H PRN Juan R Peña MD        Or    LORazepam (ATIVAN) injection 2 mg  2 mg IntraVENous Q1H PRN Juan R Peña MD        Or    LORazepam (ATIVAN) tablet 3 mg  3 mg Oral Q1H PRN Juan R Peña MD        Or    LORazepam (ATIVAN) injection 3 mg  3 mg IntraVENous Q1H PRN Juan R Peña MD        Or    LORazepam (ATIVAN) tablet 4 mg  4 mg Oral Q1H PRN Juan R Peña MD        Or    LORazepam (ATIVAN) injection 4 mg  4 mg IntraVENous Q1H PRN Juan R Peña MD        enoxaparin (LOVENOX) injection 40 mg  40 mg SubCUTAneous Daily Michelle Garcia MD        amLODIPine (NORVASC) tablet 5 mg  5 mg Oral Daily Sirena Osorio MD   5 mg at 02/21/23 1119    perflutren lipid microspheres (DEFINITY) injection 1.5 mL  1.5 mL IntraVENous ONCE PRN Sirena Osorio MD        ibuprofen (ADVIL;MOTRIN) tablet 400 mg  400 mg Oral Q8H PRN Daksha Pandya MD        sodium chloride flush 0.9 % injection 5-40 mL  5-40 mL IntraVENous 2 times per day Clark Mayers MD   10 mL at 02/21/23 2100    sodium chloride flush 0.9 % injection 5-40 mL  5-40 mL IntraVENous PRN Clark Mayers MD        0.9 % sodium chloride infusion   IntraVENous PRN Clark Mayers MD        ondansetron (ZOFRAN-ODT) disintegrating tablet 4 mg  4 mg Oral Q8H PRN Clark Mayers MD        Or    ondans[facility]ron [facility]) injection 4 mg  4 mg IntraVENous Q6H PRN Clark Mayers MD        polyethylene glycol (GLYCOLAX) packet 17 g  17 g Oral Daily PRN Clark Mayers MD        acetaminophen (TYLENOL) tablet 650 mg  650 mg Oral Q6H PRN Amrit Novak MD        Or    acetaminophen (TYLENOL) suppository 650 mg  650 mg Rectal Q6H PRN Amrit Novak MD        sodium chloride flush 0.9 % injection 5-40 mL  5-40 mL IntraVENous 2 times per day Amrit Novak MD   10 mL at 02/21/23 1121    sodium chloride flush 0.9 % injection 5-40 mL  5-40 mL IntraVENous PRN Amrit Novak MD        0.9 % sodium chloride infusion   IntraVENous PRN Amrit Novak MD        nicotine (NICODERM CQ) 14 MG/24HR 1 patch  1 patch TransDERmal Daily Amrit MD Kishore        glucose chewable tablet 16 g  4 tablet Oral PRN Amrit Drivers, MD        dextrose bolus 10% 125 mL  125 mL IntraVENous PRN Amrit Novak MD        Or    dextrose bolus 10% 250 mL  250 mL IntraVENous PRN Amrit Drivers, MD        glucagon injection 1 mg  1 mg SubCUTAneous PRN Amrit Novak MD        dextrose 10 % infusion   IntraVENous Continuous PRN Amrit Novak MD        labetalol (NORMODYNE;TRANDATE) injection 5 mg  5 mg IntraVENous Q4H PRN Amrit MD Kishore        hydrALAZINE (APRESOLINE) injection 5 mg  5 mg IntraVENous Q4H PRN Amrit MD Kishore           Allergies:  No Known Allergies    Social History:  Social History     Socioeconomic History    Marital status: Single     Spouse name: Not on file    Number of children: Not on file    Years of education: Not on file    Highest education level: Not on file   Occupational History    Not on file   Tobacco Use    Smoking status: Some Days     Packs/day: 0.50     Types: Cigarettes    Smokeless tobacco: Never   Substance and Sexual Activity    Alcohol use: Yes     Comment: beer- weekends    Drug use: Never    Sexual activity: Not on file   Other Topics Concern    Not on file   Social History Narrative    Not on file     Social Determinants of Health     Financial Resource Strain: Not on file   Food Insecurity: Not on file   Transportation Needs: Not on file   Physical Activity: Not on file   Stress: Not on file   Social Connections: Not on file   Intimate Partner Violence: Not on file   Housing Stability: Not on file          Family History:  No family history on file. REVIEW OF SYSTEMS:    Review of Systems   Constitutional:  Positive for fatigue and unexpected weight change. HENT:  Negative. All other systems reviewed and are negative. PHYSICAL EXAM:      Vitals:  Patient Vitals for the past 24 hrs:   BP Temp Temp src Pulse Resp SpO2   02/22/23 0434 (!) 147/92 98.3 °F (36.8 °C) Oral 96 18 96 %   02/21/23 2226 (!) 149/85 -- -- 95 22 --   02/21/23 2219 (!) 150/103 98.3 °F (36.8 °C) Oral (!) 104 16 --   02/21/23 2000 -- -- -- 95 -- --   02/21/23 1544 122/81 97.8 °F (36.6 °C) Oral (!) 103 18 98 %   02/21/23 1057 113/75 97.4 °F (36.3 °C) Oral 100 18 96 %   02/21/23 1045 120/87 98 °F (36.7 °C) Temporal 96 26 100 %   02/21/23 1042 -- -- -- 98 -- --   02/21/23 1030 117/82 -- -- 98 20 97 %   02/21/23 1015 115/80 -- -- 95 21 100 %   02/21/23 1005 116/85 -- -- 94 23 100 %   02/21/23 1000 117/85 -- -- 97 24 98 %   02/21/23 0955 117/78 -- -- 98 15 96 %   02/21/23 0951 120/79 98.2 °F (36.8 °C) Temporal 100 20 96 %   02/21/23 0750 130/81 98.1 °F (36.7 °C) Infrared 97 18 98 %       Date 02/22/23 0000 - 02/22/23 2359   Shift 7338-0554 8145-1995 7016-7054 24 Hour Total   INTAKE   Shift Total(mL/kg)       OUTPUT   Urine(mL/kg/hr) 400   400   Shift Total(mL/kg) 400(7.2)   400(7.2)   Weight (kg) 55.5 55.5 55.5 55.5       Physical Exam  Constitutional:       Appearance: He is ill-appearing. HENT:      Head: Normocephalic and atraumatic. Comments: Dentition is poor      Nose: Nose normal.      Mouth/Throat:      Mouth: Mucous membranes are moist.      Pharynx: Oropharynx is clear. Eyes:      Pupils: Pupils are equal, round, and reactive to light. Cardiovascular:      Rate and Rhythm: Normal rate and regular rhythm. Pulmonary:      Effort: Pulmonary effort is normal.   Abdominal:      General: Abdomen is flat. Musculoskeletal:         General: Swelling present. Normal range of motion. Cervical back: Normal range of motion. Skin:     General: Skin is warm and dry. Comments: Clubbing of fingers and nails    Neurological:      General: No focal deficit present. Mental Status: He is oriented to person, place, and time. Psychiatric:         Mood and Affect: Mood normal.         Behavior: Behavior normal.         Thought Content: Thought content normal.         Judgment: Judgment normal.          DATA:    PT/INR:    Recent Labs     02/18/23  1308   PROT 6.3*     PTT:  No results for input(s): APTT in the last 720 hours. CMP:    Recent Labs     02/22/23  0427 02/19/23  0407 02/18/23  1308      < >  --    K 4.1   < >  --       < >  --    CO2 28   < >  --    BUN 10   < >  --    PROT  --   --  6.3*    < > = values in this interval not displayed. Mg:    Recent Labs     02/22/23  0427 02/21/23  0347 02/20/23  0345   MG 1.90 1.90 2.00       Lab Results   Component Value Date    CALCIUM 9.5 02/22/2023    PHOS 2.8 02/22/2023       CBC:    Recent Labs     02/22/23  0427 02/21/23  0347 02/20/23  0345 02/19/23  0407 02/17/23  1834   WBC 9.8 8.0 8.6 9.1 9.3   NEUTROABS 7.1 5.6 6.2 6.8 7.2   LYMPHOPCT 17.2 17.6 17.1 13.3 13.6   RBC 3.35* 3.37* 3.46* 3.39* 3.57*   HGB 9.4* 9.6* 9.7* 9.5* 10.4*   HCT 28.8* 29.0* 30.3* 29.4* 31.6*   MCV 86.1 86.2 87.4 86.8 88.5   MCH 28.2 28.6 28.1 28.1 29.1   MCHC 32.7 33.1 32.1 32.3 32.9   RDW 15.7* 15.6* 15.8* 15.7* 15.8*    358 388 383 428        LDH:No results for input(s): LDH in the last 720 hours.       Radiology Review:  Bronchoscopy  No dictation       Problem List  Patient Active Problem List   Diagnosis    Lung mass    Severe malnutrition (HCC)       IMPRESSION/RECOMMENDATIONS:    RUL lung mass  Presented to ED with c/o edema, night sweats, fatigue, and unintentional weight loss   CT PA 2/17/23 with Large right upper lobe and right hilar mass consistent with primary lung cancer and Mild subcarinal and pretracheal lymphadenopathy may represent metastatic disease  CT AP 2/17/23 with  Small indeterminate left adrenal nodule and otherwise, no evidence of metastatic disease in the abdomen and pelvis  He is s/p bronch and EBUS biopsy yesterday 2/21/23  We await path report --will request molecular profiling on tissue sample   He will need a PET/CT as an outpatient to further evaluate adrenal nodule     Case management working on insurance and transition of care issues. Thank you for asking me to see the patient. 60 mins was spent in direct consultation for this visit today which includes face to face discussion and examination of patient, chart review, communication with other health care providers.      Rocky Jacques, APRN - CNP  Please Contact Through [employer]

## 2023-02-22 NOTE — CARE COORDINATION
Case Management Assessment            Discharge Note                    Date / Time of Note: 2/22/2023 2:51 PM                  Discharge Note Completed by: DAWSON Curran, OBDULIAW    Patient Name: Dusty East   YOB: 1970  Diagnosis: Lung mass [R91.8]  Malignant neoplasm of right lung, unspecified part of lung ([facility] [address] [C34.91]   Date / Time: 2/17/2023  6:25 PM    Current PCP: Pcp No  Clinic patient: No    Hospitalization in the last 30 days: No    Advance Directives:  Code Status: Full Code  [address] DNR form completed and on chart: No    Financial:  Payor: PENDING MEDICAID / Plan: PENDING MEDICAID / Product Type: *No Product type* /      Pharmacy:    [facility]/pharmacy #[id number]. NITIN Thorpe [phone] - F [phone]  [address] [facility] [address]  Phone: [phone] Fax: [phone]      Assistance purchasing medications?: Potential Assistance Purchasing Medications: Yes  Assistance provided by Case Management: Voucher with  Director Approval    Does patient want to participate in local refill/ meds to beds program?:      Meds To Beds General Rules:  1. Can ONLY be done Monday- Friday between 8:30am-5pm  2. Prescription(s) must be in pharmacy by 3pm to be filled same day  3. Copy of patient's insurance/ prescription drug card and patient face sheet must be sent along with the prescription(s)  4. Cost of Rx cannot be added to hospital bill. If financial assistance is needed, please contact unit  or ;  or  CANNOT provide pharmacy voucher for patients co-pays  5.  Patients can then  the prescription on their way out of the hospital at discharge, or pharmacy can deliver to the bedside if staff is available. (payment due at time of pick-up or delivery - cash, check, or card accepted)     Able to afford home medications/ co-pay costs: Yes    ADLS:  Current PT AM-PAC Score: 20 /24  Current OT AM-PAC Score: 19 /24      DISCHARGE Disposition: Home- No Services Needed    LOC at discharge: Not Applicable  MICHAEL Completed: No    Notification completed in HENS/PAS?:  Not Applicable    IMM Completed:   Not Indicated    Transportation:  Transportation PLAN for discharge:  lyft    Mode of Transport: Private Car      Referrals made at [employer] for outpatient continued care:  [facility], [employer] [facility], and Other: rent, prescription, income, and food assistance list    Additional CM Notes:     SW met w/Pt at bedside w/Pt. SW provide [employer] and how to apply for SSDI yesterday to pt. Pt will apply for assistance on his own. Pt will DC home. PT recs a RW, SW explained to pt it will be out of pocket since he still is pending medicaid, pt stated he will get one from [facility] [address] [address] for cheaper. Pt has a cane to use for now. SW provided Pt w/med voucher and pt will follow up w/OP clinic. Re Robertson in [employer] benefits submitted medicaid [place] for pt. Pt needs a lyft home, ANNA scheduled lyft for 5pm.    The Plan for Transition of Care is related to the following treatment goals of Lung mass [R91.8]  Malignant neoplasm of right lung, unspecified part of lung ([facility] [address] [C34.91]    The Patient and/or patient representative Wild Hernandez and his family were provided with a choice of provider and agrees with the discharge plan Yes    Freedom of choice list was provided with basic dialogue that supports the patient's individualized plan of care/goals and shares the quality data associated with the providers.  Yes    Care Transitions patient: No    DAWSON Noonan, [facility] [employer]  Case Management Department  Ph: [phone]

## 2023-02-22 NOTE — PROGRESS NOTES
Pulmonary Followup Note    CC: lower extremity swelling and pain    Subjective:  Afebrile. Resting comfortably satting 97% on room air. Bone pain improved from 6-7/10 on presentation to 3/10 today. No new acute complaints except as below per ROS. ROS:  Endorses joint and bone pain in the distal bilateral upper extremities and bilateral lower extremities, improved from admission. Denies headache, nausea or chest pain. Denies SOB. 24HR INTAKE/OUTPUT:    Intake/Output Summary (Last 24 hours) at 2023 1049  Last data filed at 2023 0759  Gross per 24 hour   Intake 480 ml   Output 1625 ml   Net -1145 ml          enoxaparin  40 mg SubCUTAneous Daily    amLODIPine  5 mg Oral Daily    sodium chloride flush  5-40 mL IntraVENous 2 times per day    sodium chloride flush  5-40 mL IntraVENous 2 times per day    nicotine  1 patch TransDERmal Daily           PHYSICAL EXAMINATION:  BP (!) 149/86   Pulse 99   Temp 98.3 °F (36.8 °C) (Oral)   Resp 18   Ht 6' 1\" (1.854 m)   Wt 127 lb 13.9 oz (58 kg)   SpO2 97%   BMI 16.87 kg/m²   CURRENT PULSE OXIMETRY:  SpO2: 97 %  24HR PULSE OXIMETRY RANGE:  SpO2  Av.8 %  Min: 96 %  Max: 98 % on room air. Gen: No acute distress. Speaking in full sentences without accessory muscle use  HEENT: PERRL, EOMI, OP nl  Lung:  Decreased lung sounds on the right upper/middle chest, otherwise CTAB. CV: RRR without M/R/R  Abd: +BS, soft, NT/ND  Ext: No edema. DATA  CBC:   Recent Labs     23   WBC 8.6 8.0 9.8   HGB 9.7* 9.6* 9.4*   HCT 30.3* 29.0* 28.8*   MCV 87.4 86.2 86.1    358 359       BMP:   Recent Labs     23   * 133* 137   K 3.8 4.1 4.1   CL 98* 98* 102   CO2 27 28 28   PHOS 2.8 2.9 2.8   BUN 11 11 10   CREATININE <0.5* <0.5* 0.5*         CXR REVIEWED BY ME AND SHOWED:  MRI BRAIN W WO CONTRAST   Final Result      1.   No acute intracranial abnormality. No evidence of intracranial metastatic disease. CT CHEST PULMONARY EMBOLISM W CONTRAST   Final Result      Chest:   1. Large right upper lobe and right hilar mass consistent with primary lung cancer. 2.  Mild subcarinal and pretracheal lymphadenopathy may represent metastatic disease. 3.  Severe emphysema. 4.  No evidence of pulmonary embolism. Right upper lobe mass encases and narrows right upper lobe pulmonary artery branches as well as bronchioles. Abdomen and pelvis:   1. Small indeterminate left adrenal nodule. 2.  Otherwise, no evidence of metastatic disease in the abdomen and pelvis. 3.  Note that the study is limited due to paucity of intra-abdominal fat/cachexia. CT ABDOMEN PELVIS W IV CONTRAST Additional Contrast? Oral   Final Result      Chest:   1. Large right upper lobe and right hilar mass consistent with primary lung cancer. 2.  Mild subcarinal and pretracheal lymphadenopathy may represent metastatic disease. 3.  Severe emphysema. 4.  No evidence of pulmonary embolism. Right upper lobe mass encases and narrows right upper lobe pulmonary artery branches as well as bronchioles. Abdomen and pelvis:   1. Small indeterminate left adrenal nodule. 2.  Otherwise, no evidence of metastatic disease in the abdomen and pelvis. 3.  Note that the study is limited due to paucity of intra-abdominal fat/cachexia. XR CHEST (2 VW)   Final Result      Large right upper lobe mass likely representing primary malignancy. Recommend CT chest or chest/abdomen/pelvis with contrast for further evaluation. ASSESSMENT/PLAN:  This is a 45 y.o. 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 Long-time smoker with about 35 years smoking 1.5 ppd. History of brain cancer in his aunt. Presented with lower extremity swelling and pain. Was hypertensive, not on home medications.    CT revealing large right upper lobe and right hilar mass encasing and narrowing the right upper lobe pulmonary artery branches & bronchioles, consistent with primary lung cancer; mild subcarinal and pretracheal LAD, possibly metastatic; severe emphysema; small indeterminate left adrenal nodule. Primary lung malignancy with possible left adrenal metastasis. Bronchoscopy performed yesterday with biopsies of primary tumor and subcarinal LN, awaiting path results. Will need PET/CT outpatient and follow-up per oncology to evaluate adrenal lesion. Patient seen and discussed with attending pulmonologist Dr. Sundeep Villanueva MD.      George Joyce MD, MS, PGY-2  Internal Medicine, [facility]  02/22/23 10:49 AM     Patient was seen, examined and discussed with Dr. Adeola Plata. I agree with the interval history. My physical exam confirms the findings listed below  Chart was reviewed including labs and medical records confirm the findings noted    Peripheral IV 02/21/23 Left;Dorsal Forearm (Active)   Number of days: 1     No major issues since he had bronch. No hemoptysis   Biopsy results are still pending.

## 2023-02-22 NOTE — DISCHARGE INSTRUCTIONS
Please adhere to follow up appointments and take all medications as directed in your discharge paperwork. You will need to obtain a PET-CT scan as an outpatient and follow up with both the resident internal medicine clinic affiliated with [facility]. as well as with the [facility] for follow up concerning the mass in your right lung.

## 2023-02-22 NOTE — PROGRESS NOTES
Progress Note    Admit Date: 2/17/2023  Diet: Regular diet    CC: leg swelling    Interval history: Patient was resting in bed this morning. He reported feeling fine, with no worsening of any joint swelling or pain. His knee pain feels the same as yesterday. He is not having shortness of breath, chest pain, or cough. HPI: 44 yo M with HTN, tobacco and aclohol use present to ED with lower extremity swelling since Dec 2022. Tried PTC diuretics which helped. Swelling progressed to his lower and upper extremity. 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 Hg 10.4 (down from 15 in 2019). CXR with large RT UL mass, CR chest abdomen pelvis with con demonstrated a large right upper lobe and right hilar mass suspicious for malignancy. Severe emphysema. No evidence of PE. There was no sign of any metastatic disease in the abdomen or pelvis.  Admitted for further workup of RUL mass    Medications:     Scheduled Meds:   enoxaparin  40 mg SubCUTAneous Daily    amLODIPine  5 mg Oral Daily    sodium chloride flush  5-40 mL IntraVENous 2 times per day    sodium chloride flush  5-40 mL IntraVENous 2 times per day    nicotine  1 patch TransDERmal Daily     Continuous Infusions:   sodium chloride      sodium chloride      dextrose       PRN Meds:LORazepam **OR** LORazepam **OR** LORazepam **OR** LORazepam **OR** LORazepam **OR** LORazepam **OR** LORazepam **OR** LORazepam, perflutren lipid microspheres, ibuprofen, sodium chloride flush, sodium chloride, ondansetron **OR** ondansetron, polyethylene glycol, acetaminophen **OR** acetaminophen, sodium chloride flush, sodium chloride, glucose, dextrose bolus **OR** dextrose bolus, glucagon (rDNA), dextrose, labetalol, hydrALAZINE    Objective:   Vitals:   T-max:  Patient Vitals for the past 8 hrs:   BP Temp Temp src Pulse Resp SpO2 Weight   02/22/23 0759 (!) 149/86 98.3 °F (36.8 °C) Oral 99 18 97 % --   02/22/23 0756 -- -- Oral -- -- -- --   02/22/23 0744 -- -- -- -- -- -- 127 lb 13.9 oz (58 kg)   02/22/23 0434 (!) 147/92 98.3 °F (36.8 °C) Oral 96 18 96 % --           Intake/Output Summary (Last 24 hours) at 2/22/2023 1007  Last data filed at 2/22/2023 0759  Gross per 24 hour   Intake 615 ml   Output 1625 ml   Net -1010 ml       Physical Exam  Constitutional:       General: He is not in acute distress. Appearance: Normal appearance. He is ill-appearing. He is not toxic-appearing or diaphoretic. Comments: Appears emaciated   Eyes:      Pupils: Pupils are equal, round, and reactive to light. Cardiovascular:      Rate and Rhythm: Regular rhythm. Tachycardia present. Pulses: Normal pulses. Heart sounds: Normal heart sounds. No murmur heard. Pulmonary:      Effort: Pulmonary effort is normal. No respiratory distress. Breath sounds: Decreased breath sounds present. Abdominal:      General: Bowel sounds are normal. There is no distension. Palpations: Abdomen is soft. Tenderness: There is no abdominal tenderness. There is no guarding. Musculoskeletal:         General: Swelling present. Comments: Swelling b/l upper extremities improved from prior exam, mildly tender to palpation of bilateral UE. Minimal swelling in lower extremities. Tender to palpation of b/l LE especially knees. Range of motion intact and improving in bilateral hands/fingers  Clubbing of fingers bilaterally. Neurological:      Mental Status: He is alert. Mental status is at baseline.      LABS:  CBC:   Recent Labs     02/20/23  0345 02/21/23  0347 02/22/23  0427   WBC 8.6 8.0 9.8   HGB 9.7* 9.6* 9.4*   HCT 30.3* 29.0* 28.8*    358 359   MCV 87.4 86.2 86.1       Renal:    Recent Labs 02/20/23 0345 02/21/23 0347 02/22/23 0427   * 133* 137   K 3.8 4.1 4.1   CL 98* 98* 102   CO2 27 28 28   BUN 11 11 10   CREATININE <0.5* <0.5* 0.5*   GLUCOSE 101* 99 135*   CALCIUM 9.0 9.3 9.5   MG 2.00 1.90 1.90   PHOS 2.8 2.9 2.8   ANIONGAP 8 7 7       Hepatic:   Recent Labs     02/20/23 0345 02/21/23 0347 02/22/23 0427   LABALBU 2.6* 2.6* 2.8*       Troponin:   No results for input(s): TROPONINI in the last 72 hours. BNP: No results for input(s): BNP in the last 72 hours. Lipids: No results for input(s): CHOL, HDL in the last 72 hours. Invalid input(s): LDLCALCU, TRIGLYCERIDE  ABGs:  No results for input(s): PHART, FNM5GCU, PO2ART, ZHN3NBE, BEART, THGBART, O2PWLNFG, VSA1NEL in the last 72 hours. INR: No results for input(s): INR in the last 72 hours. Lactate: No results for input(s): LACTATE in the last 72 hours. Cultures:  -----------------------------------------------------------------  RAD:   MRI BRAIN W WO CONTRAST   Final Result      1. No acute intracranial abnormality. No evidence of intracranial metastatic disease. CT CHEST PULMONARY EMBOLISM W CONTRAST   Final Result      Chest:   1. Large right upper lobe and right hilar mass consistent with primary lung cancer. 2.  Mild subcarinal and pretracheal lymphadenopathy may represent metastatic disease. 3.  Severe emphysema. 4.  No evidence of pulmonary embolism. Right upper lobe mass encases and narrows right upper lobe pulmonary artery branches as well as bronchioles. Abdomen and pelvis:   1. Small indeterminate left adrenal nodule. 2.  Otherwise, no evidence of metastatic disease in the abdomen and pelvis. 3.  Note that the study is limited due to paucity of intra-abdominal fat/cachexia. CT ABDOMEN PELVIS W IV CONTRAST Additional Contrast? Oral   Final Result      Chest:   1. Large right upper lobe and right hilar mass consistent with primary lung cancer.    2.  Mild subcarinal and pretracheal lymphadenopathy may represent metastatic disease. 3.  Severe emphysema. 4.  No evidence of pulmonary embolism. Right upper lobe mass encases and narrows right upper lobe pulmonary artery branches as well as bronchioles. Abdomen and pelvis:   1. Small indeterminate left adrenal nodule. 2.  Otherwise, no evidence of metastatic disease in the abdomen and pelvis. 3.  Note that the study is limited due to paucity of intra-abdominal fat/cachexia. XR CHEST (2 VW)   Final Result      Large right upper lobe mass likely representing primary malignancy. Recommend CT chest or chest/abdomen/pelvis with contrast for further evaluation. Assessment/Plan:     Right upper lobe lung mass  Tobacco use disorder  Unintentional weight loss, night sweats  Extreme fatigue  Peripheral edema  Anemia  Patient presented to ED 2/17 with leg swelling, weight loss, found to have 6 x 8 cm right upper lung mass. MRI brain 2/19 no acute intracranial abnormality, no evidence of intracranial metastatic disease. Iron/anemia profile consistent with anemia of chronic disease, likely secondary to malignancy.   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 Historically low BMI per chart (16.68 in 2014).    -Dietician evaluation - Severe malnutrition   -Start Ensure +HP BID  -BMI 16.2, down from 16.7 on admission  -Regular diet    Hypertension  -Well-controlled w/o use of PRN labetalol/hydralazine  -Continue norvasc 5mg QD      Code Status:Full code  FEN: Regular diet with ONS  PPX: Lovenox  DISPO: Felicity Martin MD Internal Medicine PGY-1  2/22/2023  10:07 AM

## 2023-02-22 NOTE — PROGRESS NOTES
Patient discharged to home via lyft, peripheral IV removed. Medications delivered and went over discharge instructions with patient, all questions and concerns answered. All belongings sent with patient.

## 2023-02-22 NOTE — PROGRESS NOTES
Occupational Therapy  Facility/Department: [address] [facility]  Daily Treatment Note  NAME: Anthony Rodriguez  : 1970  MRN: [id number]    Date of Service: 2023    Discharge Recommendations: Anthony Rodriguez scored a 19/24 on the AM-PAC ADL Inpatient form. Current research shows that an AM-PAC score of 18 or greater is typically associated with a discharge to the patient's home setting. Based on the patient's AM-PAC score, and their current ADL deficits, it is recommended that the patient have 2-3 sessions per week of Occupational Therapy at d/c to increase the patient's independence. At this time, this patient demonstrates the endurance and safety to discharge home with (home) and a follow up treatment frequency of 2-3x/wk. Please see assessment section for further patient specific details. If patient discharges prior to next session this note will serve as a discharge summary. Please see below for the latest assessment towards goals. Patient Diagnosis(es): The primary encounter diagnosis was Malignant neoplasm of right lung, unspecified part of lung ([facility] [address] A diagnosis of Lung mass was also pertinent to this visit. Assessment    Assessment: Pt agreeable to OT tx and tolerates session well. Pt continues to function slightly below baseline and demos decreased activity tolerance, decreased safety awareness, and generalized weakness. Pt requires SBA to CGA for ADLs, functional transfers, and functional mobility. Pt will benefit from continued skilled OT to address above deficits and maximize independence/safety with functional activity. Cont per POC. Activity Tolerance: Patient tolerated treatment well      Plan   Occupational Therapy Plan  Times Per Week: 2-5x  Current Treatment Recommendations: Strengthening;Balance training;Functional mobility training; Endurance training;Patient/Caregiver education & training; Safety education & training;Equipment evaluation, education, & procurement;Self-Care / ADL     Restrictions  Position Activity Restriction  Other position/activity restrictions: up with assist    Subjective   Subjective  Subjective: Pt found sitting on shower chair with RN. Pt agreeable to OT tx session. Pt pleasant and cooperative throughout. Pain: Pt does not report any pain  Orientation  Overall Orientation Status: Within Normal Limits  Cognition  Overall Cognitive Status: WNL        Objective  Treatment included functional transfer training, ADL's and pt. education. Bed Mobility Training  Bed Mobility Training: No  Balance  Sitting: Intact  Standing: With support (CGA)  Transfer Training  Transfer Training: Yes  Sit to Stand: Stand-by assistance; Additional time  Stand to Sit: Stand-by assistance; Additional time  Gait  Overall Level of Assistance: Contact-guard assistance (from bathroom to chair, in room)  Interventions: Verbal cues; Safety awareness training     ADL  Feeding: Setup;Modified independent   Grooming: Supervision  Grooming Skilled Clinical Factors: washing face seated on shower chair  UE Bathing: Stand by assistance;Setup; Increased time to complete  UE Bathing Skilled Clinical Factors: seated on shower chair  LE Bathing: Stand by assistance;Setup; Increased time to complete  LE Bathing Skilled Clinical Factors: seated on shower chair  UE Dressing: Stand by assistance  UE Dressing Skilled Clinical Factors: gown exchange  LE Dressing: Contact guard assistance; Increased time to complete  LE Dressing Skilled Clinical Factors: to don pants and socks; pt completes donning pants in stance despite encouragement and education on fall prevention and sitting to complete; no LOB noted        Safety Devices  Type of Devices: All fall risk precautions in place;Call light within reach; Chair alarm in place; Left in chair;Nurse notified     Patient Education  Education Given To: Patient  Education Provided: Role of Therapy;Plan of Care;ADL Adaptive Strategies;Transfer Training;Energy Conservation; Fall Prevention Strategies  Education Method: Verbal  Barriers to Learning: None  Education Outcome: Verbalized understanding    Goals  Short Term Goals  Time Frame for Short Term Goals: by D/C  Short Term Goal 1: Transfer to/from all common surfaces with spvn - Not met  Short Term Goal 2:  Increase standing/mobility tolerance to 5 min - met 2/22 NEW GOAL: Pt will improve standing/mobility tolerance to x8 mins for ADLs with supvn  Short Term Goal 3: Dress lower body with spvn - Not met  Short Term Goal 4: Functional mobility for ADLs/item retrieval spvn - Not met       Therapy Time   Individual Concurrent Group Co-treatment   Time In 1120         Time Out 1143         Minutes 23             Timed Code Treatment Minutes:   23 mins    Total Treatment Minutes:  23 mins      BRIANA Kimbrough OTR/L

## 2023-02-22 NOTE — PROGRESS NOTES
Physical Therapy  Daily Treatment Note    Discharge Recommendations: Juanita Tabares scored a 20/24 on the AM-PAC short mobility form. Current research shows that an AM-PAC score of 18 or greater is typically associated with a discharge to the patient's home setting. Based on the patient's AM-PAC score and their current functional mobility deficits, it is recommended that the patient have 2-3 sessions per week of Physical Therapy at d/c to increase the patient's independence. At this time, this patient demonstrates the endurance and safety to discharge home with home vs OP PT services and a follow up treatment frequency of 2-3x/wk. Please see assessment section for further patient specific details. Assessment:  Pt with improved activity tolerance this session. Gait slow and weak, but steady with wheeled walker. Stairs also slow and weak, but steady. Pt needing seated rest breaks between activities due to fatigue. Good effort and participation overall. Pt lives in apartment alone. Family to provide initial assist at home. Pt would benefit from initial 24 hour assist, use of wheeled walker at home (pt does not have, but case management addressing) and continued PT. Equipment Needs: Wheeled walker          Other position/activity restrictions: up with assist   Additional Pertinent Hx: Pt adm 2/17 with jayesh LE swelling and inability to ambulate. Chest CT  Large right upper lobe and right hilar mass consistent with primary lung cancer. 2.  Mild subcarinal and pretracheal lymphadenopathy may represent metastatic disease. 3.  Severe emphysema      Diagnosis: RUL mass, LE swelling   Treatment Diagnosis: Decreased functional mobility post admission for LE swelling and new lung mass. Subjective: Pt in chair initially. Agreeable to working with PT. Anticipated being D/Dylan home later today. Asking to get dressed. States his nephew has offered to stay with him initially and assist as needed.      Pain: No c/o voiced during session. Objective:    Transfers  Sit to stand: SBA from chair (x 5 times). Slow and effortful, but steady. Stand to sit: SBA into chair (x 5 times)    Ambulation  Assistance Level: SBA  Assistive device: Wheeled walker  Distance: 8 ft x 2 (chair <> practice stairs); ~200 ft in alejandre  Quality of gait: Flexed posture; weak; narrow CLAUDIO; decreased pace; no LOB noted    Stairs  Up/down 4 steps x 3 (12 total) with B rails CGA initially (1st trial), progressing to SBA (2nd & 3rd trial)  Other: Reciprocal pattern with ascending; Non-reciprocal pattern with descending. Weak and slow, but steady. Balance  Static stance with walker SBA; without UE support CGA  Ambulation with wheeled walker SBA  Dynamic stance without UE support (pt pulling up pants, fastening belt) CGA    Patient Education  Hand placement with transfers when using walker. Demonstrated good understanding. Calling for assist with needs. Expressed understanding. Safety Devices  Pt left with needs in reach. In chair with chair alarm on. RN updated.      AM-PAC score  AM-PAC Inpatient Mobility Raw Score : 20  AM-PAC Inpatient T-Scale Score : 47.67  Mobility Inpatient CMS 0-100% Score: 35.83  Mobility Inpatient CMS G-Code Modifier : CJ    Goals: (as determined and assessed by primary PT)  Time Frame for Short Term Goals: Discharge  Short Term Goal 1: indep bed mobility   ongoing  Short Term Goal 2: indep transfers   ongoing  Short Term Goal 3: indep gait with RW x 200 ft  ongoing  Short Term Goal 4: up and down flight of steps with rail and CG  met 2/22       Plan:  Plan: 2-5x/week  Current Treatment Recommendations: Strengthening, ROM, Functional mobility training, Transfer training, Gait training, Stair training, Safety education & training, Endurance training    Therapy Time    Individual  Concurrent  Group  Co-treatment    Time In  1409            Time Out  1448            Minutes  39              Timed Code Treatment Minutes: 39  Total Treatment Minutes: 39    Will continue per plan of care if not D/Dylan home today. If patient is discharged prior to next treatment, this note will serve as the discharge summary. Alejandro Grossman #[id number]    Addendum: 1 goal met. Continue per POC. This note will serve as a discharge summary if patient is discharged from hospital before next treatment session.    Marylene Riis, PT

## 2023-02-23 NOTE — DISCHARGE SUMMARY
Hospital Medicine Discharge Summary    Patient ID: Hal Figueredo   Gender: male  : 1970   Age: 45 y.o. MRN: [id number]  Code Status: FULL   Patient's PCP: Pcp No    Admit Date: 2023     Discharge Date: 2023     Admitting Physician: Diana Cr MD     Discharge Physician: Ira Yancey MD     Discharge Diagnoses: Active Hospital Problems    Diagnosis Date Noted    Lung mass [R91.8] 2023     Priority: Medium       The patient was seen and examined on day of discharge and this discharge summary is in conjunction with any daily progress note from day of discharge. Hospital Course:  47-year-old male with a past medical history of hypertension as well as tobacco and alcohol use presents to the ED with lower extremity swelling. The patient reports that for about 2 months he has noticed swelling up to his knees. He reports that he was able to acquire over-the-counter diuretics which seemed to help improve his leg swelling. The diuretic he acquired was diurex. He noticed that there was periodic swelling of his legs which would have some degree of response to diuretic however it was when he started noticing swelling in his wrists, hands and elbows when he decided to come to the ED. In the ED he had to be brought in via wheelchair. He does report a history of night sweats as well as unintentional weight loss of about 8 pounds in the last 2 and half months. He reports feeling less strong and active than he used to  And has had difficulty completing his activities of daily living without feeling fatigued. He denies any chest pain, shortness of breath, new rashes, nausea, vomiting, weight loss, facial congestion. He reports smoking about 1 to 1-1/2 pack  Since the age of 10. He reports occasionally consuming beer. He does not report any other drug use. He worked in [employer] most of his life.   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 On presentation to the ED his vitals were significant for tachycardia at 101 and hypertension at 150/90. CBC was significant for a hemoglobin of 10.4 which isless than his last hemoglobin of 15 from 2019. RFP was significant for hyponatremia at 132, hypochloremia 98. Chest x-ray demonstrated a large right upper lobe mass. CT chest abdomen pelvis with contrast demonstrated a large right upper lobe and right hilar mass suspicious for malignancy. Severe emphysema. No evidence of PE. There was no sign of any metastatic disease in the abdomen or pelvis. Brief hospital course:  Patient was admitted for workup of RUL mass. Patient received bronchoscopy with biopsy of this mass, and was seen by oncology. Oncology is following biopsy results and will order appropriate molecular assays. Pt will also require PET-CT to eval adrenal nodule as outpatient. While inpatient, pt remained HDS and labs were unremarkable. HTN well controled w amlodipine. Pt instructed to f/u w PCP (referred to [facility]) and [facility]. Additional details below:  Right upper lobe lung mass  Tobacco use disorder  Unintentional weight loss, night sweats  Extreme fatigue  Peripheral edema  Anemia  Patient presented to ED 2/17 with leg swelling, weight loss, found to have 6 x 8 cm right upper lung mass. MRI brain 2/19 no acute intracranial abnormality, no evidence of intracranial metastatic disease.  Iron/anemia profile consistent with anemia of chronic disease, likely secondary to malignancy.   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 Historically low BMI per chart (16.68 in 2014). -Dietician evaluation - Severe malnutrition              -Start Ensure +HP BID  -BMI 16.2, down from 16.7 on admission  -Regular diet    Hypertension  -Well-controlled w/o use of PRN labetalol/hydralazine  -Continue norvasc 5mg QD    Disposition:  Home    Physical Exam Performed:     BP (!) 145/76   Pulse (!) 105   Temp 98.4 °F (36.9 °C) (Oral)   Resp 20   Ht 6' 1\" (1.854 m)   Wt 127 lb 13.9 oz (58 kg)   SpO2 99%   BMI 16.87 kg/m²     Physical Exam  Constitutional:       General: He is not in acute distress. Appearance: Normal appearance. He is ill-appearing. He is not toxic-appearing or diaphoretic. Comments: Appears emaciated   Eyes:      Pupils: Pupils are equal, round, and reactive to light. Cardiovascular:      Rate and Rhythm: Regular rhythm. Tachycardia present. Pulses: Normal pulses. Heart sounds: Normal heart sounds. No murmur heard. Pulmonary:      Effort: Pulmonary effort is normal. No respiratory distress. Breath sounds: Decreased breath sounds present. Abdominal:      General: Bowel sounds are normal. There is no distension. Palpations: Abdomen is soft. Tenderness: There is no abdominal tenderness. There is no guarding. Musculoskeletal:         General: Swelling present. Comments: Swelling b/l upper extremities improved from prior exam, mildly tender to palpation of bilateral UE. Minimal swelling in lower extremities. Tender to palpation of b/l LE especially knees.  Range of motion intact and improving in bilateral hands/fingers  Clubbing of fingers bilaterally. Neurological:      Mental Status: He is alert. Mental status is at baseline. Labs: For convenience and continuity at follow-up the following most recent labs are provided:      CBC:    Lab Results   Component Value Date/Time    WBC 9.8 02/22/2023 04:27 AM    HGB 9.4 02/22/2023 04:27 AM    HCT 28.8 02/22/2023 04:27 AM     02/22/2023 04:27 AM       Renal:    Lab Results   Component Value Date/Time     02/22/2023 04:27 AM    K 4.1 02/22/2023 04:27 AM    K 4.2 02/17/2023 06:34 PM     02/22/2023 04:27 AM    CO2 28 02/22/2023 04:27 AM    BUN 10 02/22/2023 04:27 AM    CREATININE 0.5 02/22/2023 04:27 AM    CALCIUM 9.5 02/22/2023 04:27 AM    PHOS 2.8 02/22/2023 04:27 AM       Significant Diagnostic Studies    Radiology:   MRI BRAIN W WO CONTRAST   Final Result      1. No acute intracranial abnormality. No evidence of intracranial metastatic disease. CT CHEST PULMONARY EMBOLISM W CONTRAST   Final Result      Chest:   1. Large right upper lobe and right hilar mass consistent with primary lung cancer. 2.  Mild subcarinal and pretracheal lymphadenopathy may represent metastatic disease. 3.  Severe emphysema. 4.  No evidence of pulmonary embolism. Right upper lobe mass encases and narrows right upper lobe pulmonary artery branches as well as bronchioles. Abdomen and pelvis:   1. Small indeterminate left adrenal nodule. 2.  Otherwise, no evidence of metastatic disease in the abdomen and pelvis. 3.  Note that the study is limited due to paucity of intra-abdominal fat/cachexia. CT ABDOMEN PELVIS W IV CONTRAST Additional Contrast? Oral   Final Result      Chest:   1. Large right upper lobe and right hilar mass consistent with primary lung cancer. 2.  Mild subcarinal and pretracheal lymphadenopathy may represent metastatic disease. 3.  Severe emphysema. 4.  No evidence of pulmonary embolism.  Right upper lobe mass encases and narrows right upper lobe pulmonary artery branches as well as bronchioles. Abdomen and pelvis:   1. Small indeterminate left adrenal nodule. 2.  Otherwise, no evidence of metastatic disease in the abdomen and pelvis. 3.  Note that the study is limited due to paucity of intra-abdominal fat/cachexia. XR CHEST (2 VW)   Final Result      Large right upper lobe mass likely representing primary malignancy. Recommend CT chest or chest/abdomen/pelvis with contrast for further evaluation. Consults:     IP CONSULT TO HOSPITALIST  IP CONSULT TO PULMONOLOGY  IP CONSULT TO DIETITIAN  IP CONSULT TO SOCIAL WORK  IP CONSULT TO ONCOLOGY    Disposition:  Home     Condition at Discharge: Stable    Discharge Instructions/Follow-up: PCP, [facility]    Code Status:  Full    Activity: activity as tolerated    Diet: regular diet      Discharge Medications:     Discharge Medication List as of 2/22/2023  2:37 PM             Details   amLODIPine (NORVASC) 5 MG tablet Take 1 tablet by mouth daily, Disp-30 tablet, R-3Print             Time Spent on discharge is more than 20 minutes in the examination, evaluation, counseling and review of medications and discharge plan.       Signed:    Nazanin Nassar MD   2/23/2023

## 2023-02-27 NOTE — RESULT ENCOUNTER NOTE
Can you please schedule f/u this week or next week to discuss biopsy results. I tried to call him on his cell phone and on his sister's phone but no one answered.       Thanks

## 2023-03-01 ENCOUNTER — TELEPHONE (OUTPATIENT)
Dept: PULMONOLOGY | Age: 53
End: 2023-03-01

## 2023-03-01 NOTE — TELEPHONE ENCOUNTER
----- Message from Tena Davidson MD sent at 2/27/2023  8:58 AM EST -----  Can you please schedule f/u this week or next week to discuss biopsy results. I tried to call him on his cell phone and on his sister's phone but no one answered.       Thanks

## 2023-03-01 NOTE — TELEPHONE ENCOUNTER
Called patient again today at his sister number  Left voicemail to have Loco Estevez call the office. I did not leave any details as to what the call is about   No HIPAA on file     Flo Farfan  2/27/2023  2:58 PM EST   Called  [phone]  was told no one by the name Paris Manuel lives there. Called sister and left message on voicemail to have patient call the office.

## 2023-03-07 ENCOUNTER — TELEPHONE (OUTPATIENT)
Dept: PULMONOLOGY | Age: 53
End: 2023-03-07

## 2023-03-07 NOTE — TELEPHONE ENCOUNTER
Patient cancelled appointment today because of transportation issues. States he needs a Friday when his daughter, niece and nephew are off work. I left a voicemail to callback.    Dr. Patricia Espinoza agreed to see patient this Friday 3/10/2023  He also sees [facility] 3/10/23  Patient needs to be seen this week

## 2023-03-24 ENCOUNTER — TELEPHONE (OUTPATIENT)
Dept: PULMONOLOGY | Age: 53
End: 2023-03-24

## 2023-03-24 ENCOUNTER — TELEPHONE (OUTPATIENT)
Dept: CASE MANAGEMENT | Age: 53
End: 2023-03-24

## 2023-03-24 NOTE — TELEPHONE ENCOUNTER
Pt due for biopsy follow-up with [facility] and pulmonary. Contacted [facility] office to see if pt completed initital appointment. Office staff stated that he scheduled ov on 3/10, but did not make it for ov. Request made for office to contact pt to reschedule. Received call from [facility] [place] to assist with rescheduling appt. Tentative appt made with Dr. Neyda Vazquez for 3/31 at 2:30 at [facility]. Called pts home phone number to confirm new appt time. No answer, left vm requesting callback.       Reginaldo Madera BSN, RN   Lung Nodule Navigator  [facility] [employer]  [phone]

## 2023-03-24 NOTE — TELEPHONE ENCOUNTER
----- Message from Pia Holguin RN sent at 3/24/2023  2:05 PM EDT -----  New cancer diagnosis from hospital.  He missed his pulm appt with Dr. Angela Heredia (or had to cancel?) and also missed [facility] appt on 3/10. I reached out to [facility] and they are going to try to contact him again to reschedule. Wanted to send update to try not to have him fall through the cracks.     Thanks,  Princess RODRIGUEZN, RN   Lung Nodule Navigator  [facility] [employer]  [phone]

## 2023-03-29 ENCOUNTER — TELEPHONE (OUTPATIENT)
Dept: CASE MANAGEMENT | Age: 53
End: 2023-03-29

## 2023-03-29 NOTE — TELEPHONE ENCOUNTER
Called pt to offer assistance with coordinating and scheduling hospital follow-up appointments. No answer-left vm.        Mireille RODRIGUEZN, RN   Lung Nodule Navigator  [facility] [employer]  [phone]

## 2023-05-31 ENCOUNTER — OFFICE VISIT (OUTPATIENT)
Dept: PRIMARY CARE CLINIC | Age: 53
End: 2023-05-31
Payer: MEDICAID

## 2023-05-31 VITALS
OXYGEN SATURATION: 93 % | TEMPERATURE: 98.1 F | SYSTOLIC BLOOD PRESSURE: 106 MMHG | HEIGHT: 75 IN | WEIGHT: 118.6 LBS | BODY MASS INDEX: 14.75 KG/M2 | HEART RATE: 116 BPM | DIASTOLIC BLOOD PRESSURE: 73 MMHG

## 2023-05-31 DIAGNOSIS — C34.91 NON-SMALL CELL CARCINOMA OF RIGHT LUNG (HCC): Primary | ICD-10-CM

## 2023-05-31 DIAGNOSIS — M25.9 MULTIPLE JOINT PROBLEMS: ICD-10-CM

## 2023-05-31 PROCEDURE — 99205 OFFICE O/P NEW HI 60 MIN: CPT | Performed by: NURSE PRACTITIONER

## 2023-05-31 RX ORDER — OXYCODONE HYDROCHLORIDE 5 MG/1
5-10 TABLET ORAL EVERY 8 HOURS PRN
Qty: 30 TABLET | Refills: 0 | Status: SHIPPED | OUTPATIENT
Start: 2023-05-31 | End: 2023-06-14

## 2023-05-31 SDOH — ECONOMIC STABILITY: HOUSING INSECURITY
IN THE LAST 12 MONTHS, WAS THERE A TIME WHEN YOU DID NOT HAVE A STEADY PLACE TO SLEEP OR SLEPT IN A SHELTER (INCLUDING NOW)?: YES

## 2023-05-31 SDOH — ECONOMIC STABILITY: FOOD INSECURITY: WITHIN THE PAST 12 MONTHS, YOU WORRIED THAT YOUR FOOD WOULD RUN OUT BEFORE YOU GOT MONEY TO BUY MORE.: OFTEN TRUE

## 2023-05-31 SDOH — ECONOMIC STABILITY: FOOD INSECURITY: WITHIN THE PAST 12 MONTHS, THE FOOD YOU BOUGHT JUST DIDN'T LAST AND YOU DIDN'T HAVE MONEY TO GET MORE.: OFTEN TRUE

## 2023-05-31 SDOH — ECONOMIC STABILITY: INCOME INSECURITY: HOW HARD IS IT FOR YOU TO PAY FOR THE VERY BASICS LIKE FOOD, HOUSING, MEDICAL CARE, AND HEATING?: VERY HARD

## 2023-05-31 ASSESSMENT — PATIENT HEALTH QUESTIONNAIRE - PHQ9
8. MOVING OR SPEAKING SO SLOWLY THAT OTHER PEOPLE COULD HAVE NOTICED. OR THE OPPOSITE, BEING SO FIGETY OR RESTLESS THAT YOU HAVE BEEN MOVING AROUND A LOT MORE THAN USUAL: 0
4. FEELING TIRED OR HAVING LITTLE ENERGY: 3
3. TROUBLE FALLING OR STAYING ASLEEP: 3
5. POOR APPETITE OR OVEREATING: 0
SUM OF ALL RESPONSES TO PHQ9 QUESTIONS 1 & 2: 3
9. THOUGHTS THAT YOU WOULD BE BETTER OFF DEAD, OR OF HURTING YOURSELF: 0
6. FEELING BAD ABOUT YOURSELF - OR THAT YOU ARE A FAILURE OR HAVE LET YOURSELF OR YOUR FAMILY DOWN: 0
7. TROUBLE CONCENTRATING ON THINGS, SUCH AS READING THE NEWSPAPER OR WATCHING TELEVISION: 0
SUM OF ALL RESPONSES TO PHQ QUESTIONS 1-9: 9
2. FEELING DOWN, DEPRESSED OR HOPELESS: 3
1. LITTLE INTEREST OR PLEASURE IN DOING THINGS: 0
SUM OF ALL RESPONSES TO PHQ QUESTIONS 1-9: 9
SUM OF ALL RESPONSES TO PHQ QUESTIONS 1-9: 9
10. IF YOU CHECKED OFF ANY PROBLEMS, HOW DIFFICULT HAVE THESE PROBLEMS MADE IT FOR YOU TO DO YOUR WORK, TAKE CARE OF THINGS AT HOME, OR GET ALONG WITH OTHER PEOPLE: 1
SUM OF ALL RESPONSES TO PHQ QUESTIONS 1-9: 9

## 2023-05-31 ASSESSMENT — ENCOUNTER SYMPTOMS
NAUSEA: 0
WHEEZING: 0
SORE THROAT: 0
COUGH: 0
ABDOMINAL PAIN: 0
SHORTNESS OF BREATH: 0

## 2023-05-31 NOTE — PROGRESS NOTES
tobacco: Never   Substance Use Topics    Alcohol use: Yes     Comment: beer- weekends    Drug use: Never        Past Surgical History:   Procedure Laterality Date    BRONCHOSCOPY N/A 2/21/2023    BRONCHOSCOPY ENDOBRONCHIAL ULTRASOUND; performed by Kelly Doherty MD at [address] N/A 2/21/2023    WITH TRANSBRONCHIAL LUNG BIOPSY performed by Kelly Doherty MD at [address]  2/21/2023    BRONCHOSCOPY W/EBUS FNA performed by Kelly Doherty MD at [facility] ENDOSCOPY        No Known Allergies     No family history on file. Patient's past medical history, surgical history, family history, medications, and allergies  were all reviewed and updated as appropriate today. Review of Systems   Constitutional:  Positive for appetite change and fatigue. Negative for fever. HENT:  Negative for congestion and sore throat. Respiratory:  Negative for cough, shortness of breath and wheezing. Cardiovascular:  Negative for chest pain. Gastrointestinal:  Negative for abdominal pain and nausea. Genitourinary:  Negative for difficulty urinating. Musculoskeletal:  Positive for myalgias. Skin:  Negative for rash. Neurological:  Negative for dizziness. /73 (Site: Left Upper Arm, Position: Sitting, Cuff Size: Small Adult)   Pulse (!) 116   Temp 98.1 °F (36.7 °C)   Ht 6' 3\" (1.905 m)   Wt 118 lb 9.6 oz (53.8 kg)   SpO2 93%   BMI 14.82 kg/m²      Physical Exam  Constitutional:       Appearance: He is cachectic. Eyes:      Conjunctiva/sclera: Conjunctivae normal.   Cardiovascular:      Rate and Rhythm: Tachycardia present. Heart sounds: Normal heart sounds. Pulmonary:      Effort: No respiratory distress. Breath sounds: Normal breath sounds. No wheezing. Abdominal:      General: Abdomen is flat. Musculoskeletal:      Comments: Generalized muscle wasting, joint pain, joint swelling. Swelling/tenderness to bilateral wrists, R>L.   Right

## 2023-06-01 ENCOUNTER — CARE COORDINATION (OUTPATIENT)
Dept: CARE COORDINATION | Age: 53
End: 2023-06-01

## 2023-06-01 NOTE — CARE COORDINATION
Situation:  PCP referral Ambulatory Care Coordination [facility]) support    Left HIPAA compliant vm requesting return callback at pt's soonest convenience. Did offer this outreach is @ PCP request, invited pt to specify if any preferred day/time or number he prefers to be reached. Provided & repeated direct callback to reach this ACM. Plan: continue attempt to reach to review/offer [facility] support     Of note: pt lists himself only on HIPAA.

## 2023-06-02 ENCOUNTER — CARE COORDINATION (OUTPATIENT)
Dept: CARE COORDINATION | Age: 53
End: 2023-06-02

## 2023-06-02 NOTE — CARE COORDINATION
Follow up s/p initial outreach attempt 6.2.23 per PCP referral  vm left in review of previous message left yesterday  Will aim for final attempt to reach in the coming week.

## 2023-06-06 ENCOUNTER — CARE COORDINATION (OUTPATIENT)
Dept: CARE COORDINATION | Age: 53
End: 2023-06-06

## 2023-06-06 NOTE — CARE COORDINATION
Final attempt to reach by phone. Left detailed vm review of previous outreach attempts. Explained, for now, this will be final attempt by phone for invitation to participate in added support of Care Coordination. Encouraged pt to consider this added support, which remains available AT NO COST to patient. Extended open invitation for pt outreach to [facility]. Provided & repeated callback number to reach ACM. Patient Excluded from Care Coordination?  Yes     The patient will be excluded from Care Coordination for the following reason: Patient unable to contact to enroll     Of note: pt lists only himself on HIPAA consent - no alternate contact to attempt in outreach

## 2023-06-09 ENCOUNTER — TELEPHONE (OUTPATIENT)
Dept: PRIMARY CARE CLINIC | Age: 53
End: 2023-06-09

## 2023-06-09 ENCOUNTER — CARE COORDINATION (OUTPATIENT)
Dept: CARE COORDINATION | Age: 53
End: 2023-06-09

## 2023-06-09 DIAGNOSIS — M25.9 MULTIPLE JOINT PROBLEMS: ICD-10-CM

## 2023-06-09 DIAGNOSIS — C34.91 NON-SMALL CELL CARCINOMA OF RIGHT LUNG (HCC): Primary | ICD-10-CM

## 2023-06-09 RX ORDER — HYDROCODONE BITARTRATE AND ACETAMINOPHEN 5; 325 MG/1; MG/1
1 TABLET ORAL EVERY 6 HOURS PRN
Qty: 30 TABLET | Refills: 0 | Status: SHIPPED | OUTPATIENT
Start: 2023-06-09 | End: 2023-06-23

## 2023-06-09 NOTE — CARE COORDINATION
Pt returned outreach to ACM while ACM on line w/another pt; pt left return message inviting ACM to reattempt him. ACM did promptly attempt pt again. Left follow up vm in review of previous messages. Provided & repeated ACM number and availability. Further advised, as it is Friday - if pt/ACM unable to connect today, invited pt to consider naming specified time for ACM to attempt again on Monday.     Plan: enroll for [facility] if pt willing to engage for support

## 2023-06-09 NOTE — TELEPHONE ENCOUNTER
Pt calling saying he was rx'd Oxycodone 5mg and it is not strong enough    He says he has so much  pain that he cannot raise his right arm to bring the fork to his mouth to eat    He is requesting stronger med    Please let him know what is decided

## 2023-06-09 NOTE — TELEPHONE ENCOUNTER
Patient states he saw his oncologist on 6/6/23 and will see pulmonolgist on 6/9/23.     I mentioned from here on, for pain medication he should contact his oncologist.

## 2023-06-16 ENCOUNTER — HOSPITAL ENCOUNTER (INPATIENT)
Age: 53
LOS: 5 days | Discharge: HOSPICE | DRG: 180 | End: 2023-06-21
Attending: STUDENT IN AN ORGANIZED HEALTH CARE EDUCATION/TRAINING PROGRAM | Admitting: INTERNAL MEDICINE
Payer: COMMERCIAL

## 2023-06-16 ENCOUNTER — ANCILLARY PROCEDURE (OUTPATIENT)
Dept: EMERGENCY DEPT | Age: 53
DRG: 180 | End: 2023-06-16
Attending: STUDENT IN AN ORGANIZED HEALTH CARE EDUCATION/TRAINING PROGRAM
Payer: COMMERCIAL

## 2023-06-16 ENCOUNTER — APPOINTMENT (OUTPATIENT)
Dept: GENERAL RADIOLOGY | Age: 53
DRG: 180 | End: 2023-06-16
Payer: COMMERCIAL

## 2023-06-16 ENCOUNTER — APPOINTMENT (OUTPATIENT)
Dept: CARDIAC CATH/INVASIVE PROCEDURES | Age: 53
DRG: 180 | End: 2023-06-16
Payer: COMMERCIAL

## 2023-06-16 ENCOUNTER — APPOINTMENT (OUTPATIENT)
Dept: CT IMAGING | Age: 53
DRG: 180 | End: 2023-06-16
Payer: COMMERCIAL

## 2023-06-16 DIAGNOSIS — C34.90 METASTATIC NON-SMALL CELL LUNG CANCER (HCC): Primary | ICD-10-CM

## 2023-06-16 DIAGNOSIS — I31.31 MALIGNANT PERICARDIAL EFFUSION: ICD-10-CM

## 2023-06-16 DIAGNOSIS — R52 DIFFUSE PAIN: ICD-10-CM

## 2023-06-16 DIAGNOSIS — R29.898 SEVERE MUSCLE DECONDITIONING: ICD-10-CM

## 2023-06-16 PROBLEM — C78.7 NSCLC METASTATIC TO LIVER (HCC): Status: ACTIVE | Noted: 2023-01-01

## 2023-06-16 PROBLEM — Z71.89 ADVANCE CARE PLANNING: Status: ACTIVE | Noted: 2023-06-16

## 2023-06-16 PROBLEM — Z51.5 ENCOUNTER FOR PALLIATIVE CARE: Status: ACTIVE | Noted: 2023-01-01

## 2023-06-16 LAB
ALBUMIN SERPL-MCNC: 2.5 G/DL (ref 3.4–5)
ALBUMIN/GLOB SERPL: 0.6 {RATIO} (ref 1.1–2.2)
ALP SERPL-CCNC: 102 U/L (ref 40–129)
ALT SERPL-CCNC: 10 U/L (ref 10–40)
ANION GAP SERPL CALCULATED.3IONS-SCNC: 12 MMOL/L (ref 3–16)
APPEARANCE FLUID: CLEAR
AST SERPL-CCNC: 12 U/L (ref 15–37)
BASOPHILS # BLD: 0.1 K/UL (ref 0–0.2)
BASOPHILS NFR BLD: 1.1 %
BDY FLUID QUALITY: NORMAL
BILIRUB SERPL-MCNC: <0.2 MG/DL (ref 0–1)
BUN SERPL-MCNC: 9 MG/DL (ref 7–20)
CALCIUM SERPL-MCNC: 9.8 MG/DL (ref 8.3–10.6)
CELL COUNT FLUID TYPE: NORMAL
CHLORIDE SERPL-SCNC: 95 MMOL/L (ref 99–110)
CHOLEST SERPL-MCNC: 88 MG/DL (ref 0–199)
CO2 SERPL-SCNC: 26 MMOL/L (ref 21–32)
COLOR FLUID: YELLOW
CREAT SERPL-MCNC: <0.5 MG/DL (ref 0.9–1.3)
DEPRECATED RDW RBC AUTO: 17.5 % (ref 12.4–15.4)
EKG ATRIAL RATE: 96 BPM
EKG DIAGNOSIS: NORMAL
EKG P AXIS: 72 DEGREES
EKG P-R INTERVAL: 146 MS
EKG Q-T INTERVAL: 346 MS
EKG QRS DURATION: 82 MS
EKG QTC CALCULATION (BAZETT): 437 MS
EKG R AXIS: 50 DEGREES
EKG T AXIS: 78 DEGREES
EKG VENTRICULAR RATE: 96 BPM
EOSINOPHIL # BLD: 0 K/UL (ref 0–0.6)
EOSINOPHIL NFR BLD: 0.2 %
GFR SERPLBLD CREATININE-BSD FMLA CKD-EPI: >60 ML/MIN/{1.73_M2}
GLUCOSE SERPL-MCNC: 129 MG/DL (ref 70–99)
HCT VFR BLD AUTO: 29.9 % (ref 40.5–52.5)
HDLC SERPL-MCNC: 40 MG/DL (ref 40–60)
HGB BLD-MCNC: 9.6 G/DL (ref 13.5–17.5)
LACTATE BLDV-SCNC: 2.4 MMOL/L (ref 0.4–1.9)
LACTATE BLDV-SCNC: 2.9 MMOL/L (ref 0.4–1.9)
LDLC SERPL CALC-MCNC: 35 MG/DL
LV EF: 63 %
LVEF MODALITY: NORMAL
LYMPHOCYTES # BLD: 1.8 K/UL (ref 1–5.1)
LYMPHOCYTES NFR BLD: 18.1 %
MACROPHAGES # FLD: 93 %
MCH RBC QN AUTO: 26.2 PG (ref 26–34)
MCHC RBC AUTO-ENTMCNC: 32 G/DL (ref 31–36)
MCV RBC AUTO: 81.8 FL (ref 80–100)
MONOCYTES # BLD: 0.7 K/UL (ref 0–1.3)
MONOCYTES NFR BLD: 7 %
MONOCYTES NFR FLD: 1 %
NEUTROPHIL, FLUID: 6 %
NEUTROPHILS # BLD: 7.2 K/UL (ref 1.7–7.7)
NEUTROPHILS NFR BLD: 73.6 %
NUC CELL # FLD: 315 /CUMM
PLATELET # BLD AUTO: 410 K/UL (ref 135–450)
PMV BLD AUTO: 7.1 FL (ref 5–10.5)
POTASSIUM SERPL-SCNC: 3.7 MMOL/L (ref 3.5–5.1)
PROT SERPL-MCNC: 6.5 G/DL (ref 6.4–8.2)
RBC # BLD AUTO: 3.65 M/UL (ref 4.2–5.9)
RBC FLUID: 2400 /CUMM
SODIUM SERPL-SCNC: 133 MMOL/L (ref 136–145)
TOTAL CELLS COUNTED FLD: 100
TRIGL SERPL-MCNC: 66 MG/DL (ref 0–150)
TSH SERPL DL<=0.005 MIU/L-ACNC: 2.41 UIU/ML (ref 0.27–4.2)
VLDLC SERPL CALC-MCNC: 13 MG/DL
WBC # BLD AUTO: 9.8 K/UL (ref 4–11)

## 2023-06-16 PROCEDURE — 6370000000 HC RX 637 (ALT 250 FOR IP): Performed by: INTERNAL MEDICINE

## 2023-06-16 PROCEDURE — 99152 MOD SED SAME PHYS/QHP 5/>YRS: CPT

## 2023-06-16 PROCEDURE — 2580000003 HC RX 258: Performed by: INTERNAL MEDICINE

## 2023-06-16 PROCEDURE — 93308 TTE F-UP OR LMTD: CPT

## 2023-06-16 PROCEDURE — 96375 TX/PRO/DX INJ NEW DRUG ADDON: CPT

## 2023-06-16 PROCEDURE — 6360000002 HC RX W HCPCS: Performed by: INTERNAL MEDICINE

## 2023-06-16 PROCEDURE — 36415 COLL VENOUS BLD VENIPUNCTURE: CPT

## 2023-06-16 PROCEDURE — 84157 ASSAY OF PROTEIN OTHER: CPT

## 2023-06-16 PROCEDURE — 83605 ASSAY OF LACTIC ACID: CPT

## 2023-06-16 PROCEDURE — 83615 LACTATE (LD) (LDH) ENZYME: CPT

## 2023-06-16 PROCEDURE — 73030 X-RAY EXAM OF SHOULDER: CPT

## 2023-06-16 PROCEDURE — 96361 HYDRATE IV INFUSION ADD-ON: CPT

## 2023-06-16 PROCEDURE — 99254 IP/OBS CNSLTJ NEW/EST MOD 60: CPT | Performed by: NURSE PRACTITIONER

## 2023-06-16 PROCEDURE — 6360000002 HC RX W HCPCS: Performed by: STUDENT IN AN ORGANIZED HEALTH CARE EDUCATION/TRAINING PROGRAM

## 2023-06-16 PROCEDURE — 73130 X-RAY EXAM OF HAND: CPT

## 2023-06-16 PROCEDURE — 89051 BODY FLUID CELL COUNT: CPT

## 2023-06-16 PROCEDURE — 87206 SMEAR FLUORESCENT/ACID STAI: CPT

## 2023-06-16 PROCEDURE — 97530 THERAPEUTIC ACTIVITIES: CPT

## 2023-06-16 PROCEDURE — 93005 ELECTROCARDIOGRAM TRACING: CPT | Performed by: STUDENT IN AN ORGANIZED HEALTH CARE EDUCATION/TRAINING PROGRAM

## 2023-06-16 PROCEDURE — 6370000000 HC RX 637 (ALT 250 FOR IP): Performed by: NURSE PRACTITIONER

## 2023-06-16 PROCEDURE — 97166 OT EVAL MOD COMPLEX 45 MIN: CPT

## 2023-06-16 PROCEDURE — 80053 COMPREHEN METABOLIC PANEL: CPT

## 2023-06-16 PROCEDURE — 97535 SELF CARE MNGMENT TRAINING: CPT

## 2023-06-16 PROCEDURE — C1729 CATH, DRAINAGE: HCPCS

## 2023-06-16 PROCEDURE — 80061 LIPID PANEL: CPT

## 2023-06-16 PROCEDURE — 93306 TTE W/DOPPLER COMPLETE: CPT

## 2023-06-16 PROCEDURE — 82150 ASSAY OF AMYLASE: CPT

## 2023-06-16 PROCEDURE — 87205 SMEAR GRAM STAIN: CPT

## 2023-06-16 PROCEDURE — 99222 1ST HOSP IP/OBS MODERATE 55: CPT | Performed by: NURSE PRACTITIONER

## 2023-06-16 PROCEDURE — 93005 ELECTROCARDIOGRAM TRACING: CPT | Performed by: INTERNAL MEDICINE

## 2023-06-16 PROCEDURE — 87070 CULTURE OTHR SPECIMN AEROBIC: CPT

## 2023-06-16 PROCEDURE — 93010 ELECTROCARDIOGRAM REPORT: CPT | Performed by: INTERNAL MEDICINE

## 2023-06-16 PROCEDURE — 94799 UNLISTED PULMONARY SVC/PX: CPT

## 2023-06-16 PROCEDURE — 82945 GLUCOSE OTHER FLUID: CPT

## 2023-06-16 PROCEDURE — 0W9D3ZZ DRAINAGE OF PERICARDIAL CAVITY, PERCUTANEOUS APPROACH: ICD-10-PCS | Performed by: INTERNAL MEDICINE

## 2023-06-16 PROCEDURE — 6360000004 HC RX CONTRAST MEDICATION: Performed by: STUDENT IN AN ORGANIZED HEALTH CARE EDUCATION/TRAINING PROGRAM

## 2023-06-16 PROCEDURE — 73560 X-RAY EXAM OF KNEE 1 OR 2: CPT

## 2023-06-16 PROCEDURE — 85025 COMPLETE CBC W/AUTO DIFF WBC: CPT

## 2023-06-16 PROCEDURE — 71045 X-RAY EXAM CHEST 1 VIEW: CPT

## 2023-06-16 PROCEDURE — 96374 THER/PROPH/DIAG INJ IV PUSH: CPT

## 2023-06-16 PROCEDURE — 71260 CT THORAX DX C+: CPT

## 2023-06-16 PROCEDURE — 99285 EMERGENCY DEPT VISIT HI MDM: CPT

## 2023-06-16 PROCEDURE — 97162 PT EVAL MOD COMPLEX 30 MIN: CPT

## 2023-06-16 PROCEDURE — 97116 GAIT TRAINING THERAPY: CPT

## 2023-06-16 PROCEDURE — 96367 TX/PROPH/DG ADDL SEQ IV INF: CPT

## 2023-06-16 PROCEDURE — 6370000000 HC RX 637 (ALT 250 FOR IP): Performed by: STUDENT IN AN ORGANIZED HEALTH CARE EDUCATION/TRAINING PROGRAM

## 2023-06-16 PROCEDURE — 87116 MYCOBACTERIA CULTURE: CPT

## 2023-06-16 PROCEDURE — 2060000000 HC ICU INTERMEDIATE R&B

## 2023-06-16 PROCEDURE — 6360000002 HC RX W HCPCS

## 2023-06-16 PROCEDURE — 33017 PRCRD DRG 6YR+ W/O CGEN CAR: CPT

## 2023-06-16 PROCEDURE — 82042 OTHER SOURCE ALBUMIN QUAN EA: CPT

## 2023-06-16 PROCEDURE — 96366 THER/PROPH/DIAG IV INF ADDON: CPT

## 2023-06-16 PROCEDURE — 99153 MOD SED SAME PHYS/QHP EA: CPT

## 2023-06-16 PROCEDURE — 2500000003 HC RX 250 WO HCPCS

## 2023-06-16 PROCEDURE — 94150 VITAL CAPACITY TEST: CPT

## 2023-06-16 PROCEDURE — 33016 PERICARDIOCENTESIS W/IMAGING: CPT | Performed by: INTERNAL MEDICINE

## 2023-06-16 PROCEDURE — 99152 MOD SED SAME PHYS/QHP 5/>YRS: CPT | Performed by: INTERNAL MEDICINE

## 2023-06-16 PROCEDURE — 87040 BLOOD CULTURE FOR BACTERIA: CPT

## 2023-06-16 PROCEDURE — 2580000003 HC RX 258: Performed by: STUDENT IN AN ORGANIZED HEALTH CARE EDUCATION/TRAINING PROGRAM

## 2023-06-16 PROCEDURE — 96365 THER/PROPH/DIAG IV INF INIT: CPT

## 2023-06-16 PROCEDURE — 1200000000 HC SEMI PRIVATE

## 2023-06-16 PROCEDURE — 84443 ASSAY THYROID STIM HORMONE: CPT

## 2023-06-16 RX ORDER — SODIUM CHLORIDE, SODIUM LACTATE, POTASSIUM CHLORIDE, AND CALCIUM CHLORIDE .6; .31; .03; .02 G/100ML; G/100ML; G/100ML; G/100ML
30 INJECTION, SOLUTION INTRAVENOUS ONCE
Status: COMPLETED | OUTPATIENT
Start: 2023-06-16 | End: 2023-06-16

## 2023-06-16 RX ORDER — HYDROMORPHONE HYDROCHLORIDE 1 MG/ML
0.25 INJECTION, SOLUTION INTRAMUSCULAR; INTRAVENOUS; SUBCUTANEOUS
Status: DISCONTINUED | OUTPATIENT
Start: 2023-06-16 | End: 2023-06-16 | Stop reason: SDUPTHER

## 2023-06-16 RX ORDER — ONDANSETRON 4 MG/1
4 TABLET, ORALLY DISINTEGRATING ORAL EVERY 6 HOURS PRN
Status: DISCONTINUED | OUTPATIENT
Start: 2023-06-16 | End: 2023-06-21 | Stop reason: HOSPADM

## 2023-06-16 RX ORDER — ACETAMINOPHEN 500 MG
1000 TABLET ORAL EVERY 8 HOURS SCHEDULED
Status: DISCONTINUED | OUTPATIENT
Start: 2023-06-16 | End: 2023-06-21 | Stop reason: HOSPADM

## 2023-06-16 RX ORDER — SODIUM CHLORIDE 9 MG/ML
INJECTION, SOLUTION INTRAVENOUS PRN
Status: DISCONTINUED | OUTPATIENT
Start: 2023-06-16 | End: 2023-06-21 | Stop reason: HOSPADM

## 2023-06-16 RX ORDER — KETOROLAC TROMETHAMINE 30 MG/ML
15 INJECTION, SOLUTION INTRAMUSCULAR; INTRAVENOUS ONCE
Status: COMPLETED | OUTPATIENT
Start: 2023-06-16 | End: 2023-06-16

## 2023-06-16 RX ORDER — POLYETHYLENE GLYCOL 3350 17 G/17G
17 POWDER, FOR SOLUTION ORAL DAILY PRN
Status: DISCONTINUED | OUTPATIENT
Start: 2023-06-16 | End: 2023-06-21 | Stop reason: HOSPADM

## 2023-06-16 RX ORDER — PROCHLORPERAZINE EDISYLATE 5 MG/ML
10 INJECTION INTRAMUSCULAR; INTRAVENOUS EVERY 6 HOURS PRN
Status: DISCONTINUED | OUTPATIENT
Start: 2023-06-16 | End: 2023-06-21 | Stop reason: HOSPADM

## 2023-06-16 RX ORDER — SODIUM CHLORIDE, SODIUM LACTATE, POTASSIUM CHLORIDE, AND CALCIUM CHLORIDE .6; .31; .03; .02 G/100ML; G/100ML; G/100ML; G/100ML
1000 INJECTION, SOLUTION INTRAVENOUS ONCE
Status: DISCONTINUED | OUTPATIENT
Start: 2023-06-16 | End: 2023-06-16

## 2023-06-16 RX ORDER — PROCHLORPERAZINE MALEATE 10 MG
10 TABLET ORAL EVERY 6 HOURS PRN
Status: DISCONTINUED | OUTPATIENT
Start: 2023-06-16 | End: 2023-06-21 | Stop reason: HOSPADM

## 2023-06-16 RX ORDER — SODIUM CHLORIDE, SODIUM LACTATE, POTASSIUM CHLORIDE, AND CALCIUM CHLORIDE .6; .31; .03; .02 G/100ML; G/100ML; G/100ML; G/100ML
1000 INJECTION, SOLUTION INTRAVENOUS ONCE
Status: COMPLETED | OUTPATIENT
Start: 2023-06-16 | End: 2023-06-16

## 2023-06-16 RX ORDER — SODIUM CHLORIDE 0.9 % (FLUSH) 0.9 %
5-40 SYRINGE (ML) INJECTION PRN
Status: DISCONTINUED | OUTPATIENT
Start: 2023-06-16 | End: 2023-06-21 | Stop reason: HOSPADM

## 2023-06-16 RX ORDER — SODIUM CHLORIDE, SODIUM LACTATE, POTASSIUM CHLORIDE, CALCIUM CHLORIDE 600; 310; 30; 20 MG/100ML; MG/100ML; MG/100ML; MG/100ML
INJECTION, SOLUTION INTRAVENOUS CONTINUOUS
Status: DISCONTINUED | OUTPATIENT
Start: 2023-06-16 | End: 2023-06-21 | Stop reason: HOSPADM

## 2023-06-16 RX ORDER — OXYCODONE HYDROCHLORIDE 5 MG/1
5 TABLET ORAL EVERY 4 HOURS PRN
Status: DISCONTINUED | OUTPATIENT
Start: 2023-06-16 | End: 2023-06-21 | Stop reason: HOSPADM

## 2023-06-16 RX ORDER — KETOROLAC TROMETHAMINE 30 MG/ML
30 INJECTION, SOLUTION INTRAMUSCULAR; INTRAVENOUS EVERY 6 HOURS PRN
Status: DISPENSED | OUTPATIENT
Start: 2023-06-16 | End: 2023-06-21

## 2023-06-16 RX ORDER — MECOBALAMIN 5000 MCG
5 TABLET,DISINTEGRATING ORAL NIGHTLY
Status: DISCONTINUED | OUTPATIENT
Start: 2023-06-16 | End: 2023-06-21 | Stop reason: HOSPADM

## 2023-06-16 RX ORDER — SODIUM CHLORIDE 0.9 % (FLUSH) 0.9 %
5-40 SYRINGE (ML) INJECTION EVERY 12 HOURS SCHEDULED
Status: DISCONTINUED | OUTPATIENT
Start: 2023-06-16 | End: 2023-06-21 | Stop reason: HOSPADM

## 2023-06-16 RX ORDER — SENNA AND DOCUSATE SODIUM 50; 8.6 MG/1; MG/1
1 TABLET, FILM COATED ORAL 2 TIMES DAILY
Status: DISCONTINUED | OUTPATIENT
Start: 2023-06-16 | End: 2023-06-21 | Stop reason: HOSPADM

## 2023-06-16 RX ORDER — MAGNESIUM HYDROXIDE/ALUMINUM HYDROXICE/SIMETHICONE 120; 1200; 1200 MG/30ML; MG/30ML; MG/30ML
30 SUSPENSION ORAL EVERY 6 HOURS PRN
Status: DISCONTINUED | OUTPATIENT
Start: 2023-06-16 | End: 2023-06-21 | Stop reason: HOSPADM

## 2023-06-16 RX ORDER — ONDANSETRON 2 MG/ML
4 INJECTION INTRAMUSCULAR; INTRAVENOUS EVERY 6 HOURS PRN
Status: DISCONTINUED | OUTPATIENT
Start: 2023-06-16 | End: 2023-06-21 | Stop reason: HOSPADM

## 2023-06-16 RX ORDER — OXYCODONE HYDROCHLORIDE 5 MG/1
10 TABLET ORAL EVERY 4 HOURS PRN
Status: DISCONTINUED | OUTPATIENT
Start: 2023-06-16 | End: 2023-06-21 | Stop reason: HOSPADM

## 2023-06-16 RX ORDER — ENOXAPARIN SODIUM 100 MG/ML
40 INJECTION SUBCUTANEOUS DAILY
Status: DISCONTINUED | OUTPATIENT
Start: 2023-06-16 | End: 2023-06-21 | Stop reason: HOSPADM

## 2023-06-16 RX ORDER — HYDROMORPHONE HYDROCHLORIDE 1 MG/ML
0.5 INJECTION, SOLUTION INTRAMUSCULAR; INTRAVENOUS; SUBCUTANEOUS
Status: COMPLETED | OUTPATIENT
Start: 2023-06-16 | End: 2023-06-16

## 2023-06-16 RX ORDER — HYDROMORPHONE HYDROCHLORIDE 1 MG/ML
0.5 INJECTION, SOLUTION INTRAMUSCULAR; INTRAVENOUS; SUBCUTANEOUS
Status: DISCONTINUED | OUTPATIENT
Start: 2023-06-16 | End: 2023-06-16 | Stop reason: SDUPTHER

## 2023-06-16 RX ORDER — OXYCODONE HYDROCHLORIDE 5 MG/1
10 TABLET ORAL ONCE
Status: COMPLETED | OUTPATIENT
Start: 2023-06-16 | End: 2023-06-16

## 2023-06-16 RX ADMIN — OXYCODONE HYDROCHLORIDE 10 MG: 5 TABLET ORAL at 05:49

## 2023-06-16 RX ADMIN — OXYCODONE HYDROCHLORIDE 10 MG: 5 TABLET ORAL at 23:30

## 2023-06-16 RX ADMIN — CEFEPIME 2000 MG: 2 INJECTION, POWDER, FOR SOLUTION INTRAVENOUS at 02:41

## 2023-06-16 RX ADMIN — ACETAMINOPHEN 1000 MG: 500 TABLET ORAL at 18:30

## 2023-06-16 RX ADMIN — HYDROMORPHONE HYDROCHLORIDE 0.5 MG: 1 INJECTION, SOLUTION INTRAMUSCULAR; INTRAVENOUS; SUBCUTANEOUS at 02:29

## 2023-06-16 RX ADMIN — SODIUM CHLORIDE, POTASSIUM CHLORIDE, SODIUM LACTATE AND CALCIUM CHLORIDE: 600; 310; 30; 20 INJECTION, SOLUTION INTRAVENOUS at 10:10

## 2023-06-16 RX ADMIN — IOPAMIDOL 75 ML: 755 INJECTION, SOLUTION INTRAVENOUS at 04:47

## 2023-06-16 RX ADMIN — SODIUM CHLORIDE, PRESERVATIVE FREE 10 ML: 5 INJECTION INTRAVENOUS at 10:06

## 2023-06-16 RX ADMIN — HYDROMORPHONE HYDROCHLORIDE 0.5 MG: 1 INJECTION, SOLUTION INTRAMUSCULAR; INTRAVENOUS; SUBCUTANEOUS at 19:55

## 2023-06-16 RX ADMIN — SODIUM CHLORIDE, POTASSIUM CHLORIDE, SODIUM LACTATE AND CALCIUM CHLORIDE 1797 ML: 600; 310; 30; 20 INJECTION, SOLUTION INTRAVENOUS at 02:32

## 2023-06-16 RX ADMIN — SODIUM CHLORIDE, PRESERVATIVE FREE 10 ML: 5 INJECTION INTRAVENOUS at 19:56

## 2023-06-16 RX ADMIN — SODIUM CHLORIDE, POTASSIUM CHLORIDE, SODIUM LACTATE AND CALCIUM CHLORIDE 1000 ML: 600; 310; 30; 20 INJECTION, SOLUTION INTRAVENOUS at 05:50

## 2023-06-16 RX ADMIN — KETOROLAC TROMETHAMINE 15 MG: 30 INJECTION, SOLUTION INTRAMUSCULAR; INTRAVENOUS at 05:49

## 2023-06-16 RX ADMIN — ALUMINUM HYDROXIDE, MAGNESIUM HYDROXIDE, AND SIMETHICONE 30 ML: 200; 200; 20 SUSPENSION ORAL at 23:30

## 2023-06-16 RX ADMIN — ACETAMINOPHEN 1000 MG: 500 TABLET ORAL at 10:06

## 2023-06-16 RX ADMIN — Medication 5 MG: at 23:30

## 2023-06-16 RX ADMIN — VANCOMYCIN HYDROCHLORIDE 1000 MG: 10 INJECTION, POWDER, LYOPHILIZED, FOR SOLUTION INTRAVENOUS at 03:22

## 2023-06-16 RX ADMIN — SENNOSIDES AND DOCUSATE SODIUM 1 TABLET: 50; 8.6 TABLET ORAL at 19:58

## 2023-06-16 ASSESSMENT — PAIN DESCRIPTION - LOCATION
LOCATION: SHOULDER
LOCATION: GENERALIZED
LOCATION: SHOULDER

## 2023-06-16 ASSESSMENT — ENCOUNTER SYMPTOMS
SHORTNESS OF BREATH: 1
EYES NEGATIVE: 1
BACK PAIN: 1
GASTROINTESTINAL NEGATIVE: 1

## 2023-06-16 ASSESSMENT — PAIN DESCRIPTION - PAIN TYPE
TYPE: CHRONIC PAIN;ACUTE PAIN
TYPE: CHRONIC PAIN

## 2023-06-16 ASSESSMENT — PAIN DESCRIPTION - DESCRIPTORS
DESCRIPTORS: ACHING
DESCRIPTORS: SHARP;SHOOTING
DESCRIPTORS: STABBING;SHOOTING

## 2023-06-16 ASSESSMENT — PAIN SCALES - GENERAL
PAINLEVEL_OUTOF10: 10
PAINLEVEL_OUTOF10: 0
PAINLEVEL_OUTOF10: 6
PAINLEVEL_OUTOF10: 10
PAINLEVEL_OUTOF10: 2
PAINLEVEL_OUTOF10: 2
PAINLEVEL_OUTOF10: 7
PAINLEVEL_OUTOF10: 7
PAINLEVEL_OUTOF10: 6
PAINLEVEL_OUTOF10: 6

## 2023-06-16 ASSESSMENT — PAIN DESCRIPTION - ORIENTATION
ORIENTATION: RIGHT
ORIENTATION: RIGHT
ORIENTATION: OTHER (COMMENT)
ORIENTATION: RIGHT

## 2023-06-16 ASSESSMENT — PAIN - FUNCTIONAL ASSESSMENT
PAIN_FUNCTIONAL_ASSESSMENT: PREVENTS OR INTERFERES SOME ACTIVE ACTIVITIES AND ADLS
PAIN_FUNCTIONAL_ASSESSMENT: 0-10
PAIN_FUNCTIONAL_ASSESSMENT: PREVENTS OR INTERFERES SOME ACTIVE ACTIVITIES AND ADLS
PAIN_FUNCTIONAL_ASSESSMENT: ACTIVITIES ARE NOT PREVENTED

## 2023-06-16 ASSESSMENT — PAIN SCALES - WONG BAKER: WONGBAKER_NUMERICALRESPONSE: 0

## 2023-06-16 ASSESSMENT — PAIN DESCRIPTION - ONSET
ONSET: ON-GOING

## 2023-06-16 ASSESSMENT — PAIN DESCRIPTION - FREQUENCY
FREQUENCY: CONTINUOUS

## 2023-06-17 LAB
ALBUMIN FLD-MCNC: 1.9 G/DL
AMYLASE FLD-CCNC: 33 U/L
ANION GAP SERPL CALCULATED.3IONS-SCNC: 7 MMOL/L (ref 3–16)
BASOPHILS # BLD: 0.1 K/UL (ref 0–0.2)
BASOPHILS NFR BLD: 0.4 %
BUN SERPL-MCNC: 9 MG/DL (ref 7–20)
CALCIUM SERPL-MCNC: 9.3 MG/DL (ref 8.3–10.6)
CHLORIDE SERPL-SCNC: 98 MMOL/L (ref 99–110)
CO2 SERPL-SCNC: 30 MMOL/L (ref 21–32)
CREAT SERPL-MCNC: <0.5 MG/DL (ref 0.9–1.3)
DEPRECATED RDW RBC AUTO: 17.1 % (ref 12.4–15.4)
EOSINOPHIL # BLD: 0 K/UL (ref 0–0.6)
EOSINOPHIL NFR BLD: 0 %
GFR SERPLBLD CREATININE-BSD FMLA CKD-EPI: >60 ML/MIN/{1.73_M2}
GLUCOSE FLD-MCNC: 113 MG/DL
GLUCOSE SERPL-MCNC: 115 MG/DL (ref 70–99)
HCT VFR BLD AUTO: 27.8 % (ref 40.5–52.5)
HGB BLD-MCNC: 8.7 G/DL (ref 13.5–17.5)
LACTATE BLDV-SCNC: 1.5 MMOL/L (ref 0.4–2)
LDH FLD L TO P-CCNC: 395 U/L
LYMPHOCYTES # BLD: 0.9 K/UL (ref 1–5.1)
LYMPHOCYTES NFR BLD: 6.1 %
MAGNESIUM SERPL-MCNC: 1.8 MG/DL (ref 1.8–2.4)
MCH RBC QN AUTO: 25.8 PG (ref 26–34)
MCHC RBC AUTO-ENTMCNC: 31.3 G/DL (ref 31–36)
MCV RBC AUTO: 82.5 FL (ref 80–100)
MONOCYTES # BLD: 1.1 K/UL (ref 0–1.3)
MONOCYTES NFR BLD: 7.3 %
NEUTROPHILS # BLD: 13 K/UL (ref 1.7–7.7)
NEUTROPHILS NFR BLD: 86.2 %
PLATELET # BLD AUTO: 429 K/UL (ref 135–450)
PMV BLD AUTO: 7.5 FL (ref 5–10.5)
POTASSIUM SERPL-SCNC: 3.5 MMOL/L (ref 3.5–5.1)
PROT FLD-MCNC: 3.9 G/DL
RBC # BLD AUTO: 3.37 M/UL (ref 4.2–5.9)
SODIUM SERPL-SCNC: 135 MMOL/L (ref 136–145)
SPECIMEN SOURCE FLD: NORMAL
WBC # BLD AUTO: 15 K/UL (ref 4–11)

## 2023-06-17 PROCEDURE — 80048 BASIC METABOLIC PNL TOTAL CA: CPT

## 2023-06-17 PROCEDURE — 83605 ASSAY OF LACTIC ACID: CPT

## 2023-06-17 PROCEDURE — 2060000000 HC ICU INTERMEDIATE R&B

## 2023-06-17 PROCEDURE — 83735 ASSAY OF MAGNESIUM: CPT

## 2023-06-17 PROCEDURE — 2580000003 HC RX 258: Performed by: INTERNAL MEDICINE

## 2023-06-17 PROCEDURE — 6370000000 HC RX 637 (ALT 250 FOR IP): Performed by: NURSE PRACTITIONER

## 2023-06-17 PROCEDURE — 36415 COLL VENOUS BLD VENIPUNCTURE: CPT

## 2023-06-17 PROCEDURE — 6360000002 HC RX W HCPCS: Performed by: INTERNAL MEDICINE

## 2023-06-17 PROCEDURE — 99233 SBSQ HOSP IP/OBS HIGH 50: CPT | Performed by: NURSE PRACTITIONER

## 2023-06-17 PROCEDURE — 6370000000 HC RX 637 (ALT 250 FOR IP): Performed by: INTERNAL MEDICINE

## 2023-06-17 PROCEDURE — 93308 TTE F-UP OR LMTD: CPT

## 2023-06-17 PROCEDURE — 85025 COMPLETE CBC W/AUTO DIFF WBC: CPT

## 2023-06-17 RX ADMIN — ACETAMINOPHEN 1000 MG: 500 TABLET ORAL at 09:06

## 2023-06-17 RX ADMIN — SODIUM CHLORIDE, PRESERVATIVE FREE 10 ML: 5 INJECTION INTRAVENOUS at 09:06

## 2023-06-17 RX ADMIN — ACETAMINOPHEN 1000 MG: 500 TABLET ORAL at 16:27

## 2023-06-17 RX ADMIN — ACETAMINOPHEN 1000 MG: 500 TABLET ORAL at 03:18

## 2023-06-17 RX ADMIN — SODIUM CHLORIDE, PRESERVATIVE FREE 10 ML: 5 INJECTION INTRAVENOUS at 21:29

## 2023-06-17 RX ADMIN — SENNOSIDES AND DOCUSATE SODIUM 1 TABLET: 50; 8.6 TABLET ORAL at 21:28

## 2023-06-17 RX ADMIN — Medication 5 MG: at 21:28

## 2023-06-17 RX ADMIN — ENOXAPARIN SODIUM 40 MG: 100 INJECTION SUBCUTANEOUS at 09:06

## 2023-06-17 RX ADMIN — OXYCODONE HYDROCHLORIDE 10 MG: 5 TABLET ORAL at 08:50

## 2023-06-17 RX ADMIN — SENNOSIDES AND DOCUSATE SODIUM 1 TABLET: 50; 8.6 TABLET ORAL at 09:06

## 2023-06-17 RX ADMIN — OXYCODONE HYDROCHLORIDE 10 MG: 5 TABLET ORAL at 16:26

## 2023-06-17 RX ADMIN — KETOROLAC TROMETHAMINE 30 MG: 30 INJECTION, SOLUTION INTRAMUSCULAR at 16:27

## 2023-06-17 ASSESSMENT — PAIN SCALES - GENERAL
PAINLEVEL_OUTOF10: 2
PAINLEVEL_OUTOF10: 6
PAINLEVEL_OUTOF10: 8
PAINLEVEL_OUTOF10: 5
PAINLEVEL_OUTOF10: 4
PAINLEVEL_OUTOF10: 4
PAINLEVEL_OUTOF10: 0
PAINLEVEL_OUTOF10: 0

## 2023-06-17 ASSESSMENT — PAIN DESCRIPTION - LOCATION
LOCATION: SHOULDER

## 2023-06-17 ASSESSMENT — PAIN DESCRIPTION - ORIENTATION
ORIENTATION: RIGHT

## 2023-06-17 ASSESSMENT — PAIN - FUNCTIONAL ASSESSMENT
PAIN_FUNCTIONAL_ASSESSMENT: PREVENTS OR INTERFERES SOME ACTIVE ACTIVITIES AND ADLS

## 2023-06-17 ASSESSMENT — PAIN DESCRIPTION - DIRECTION: RADIATING_TOWARDS: NECK

## 2023-06-17 ASSESSMENT — PAIN SCALES - WONG BAKER: WONGBAKER_NUMERICALRESPONSE: 2

## 2023-06-17 ASSESSMENT — PAIN DESCRIPTION - DESCRIPTORS
DESCRIPTORS: SHARP;STABBING
DESCRIPTORS: ACHING
DESCRIPTORS: SHARP
DESCRIPTORS: SHARP
DESCRIPTORS: ACHING

## 2023-06-17 ASSESSMENT — PAIN DESCRIPTION - ONSET
ONSET: ON-GOING
ONSET: ON-GOING

## 2023-06-17 ASSESSMENT — PAIN DESCRIPTION - FREQUENCY
FREQUENCY: CONTINUOUS
FREQUENCY: CONTINUOUS

## 2023-06-17 ASSESSMENT — PAIN DESCRIPTION - PAIN TYPE
TYPE: CHRONIC PAIN
TYPE: CHRONIC PAIN

## 2023-06-18 LAB
ACID FAST STN SPEC QL: NORMAL
ANION GAP SERPL CALCULATED.3IONS-SCNC: 11 MMOL/L (ref 3–16)
BASOPHILS # BLD: 0 K/UL (ref 0–0.2)
BASOPHILS NFR BLD: 0.2 %
BUN SERPL-MCNC: 13 MG/DL (ref 7–20)
CALCIUM SERPL-MCNC: 9.3 MG/DL (ref 8.3–10.6)
CHLORIDE SERPL-SCNC: 98 MMOL/L (ref 99–110)
CO2 SERPL-SCNC: 28 MMOL/L (ref 21–32)
CREAT SERPL-MCNC: <0.5 MG/DL (ref 0.9–1.3)
DEPRECATED RDW RBC AUTO: 17.4 % (ref 12.4–15.4)
EOSINOPHIL # BLD: 0.1 K/UL (ref 0–0.6)
EOSINOPHIL NFR BLD: 0.7 %
GFR SERPLBLD CREATININE-BSD FMLA CKD-EPI: >60 ML/MIN/{1.73_M2}
GLUCOSE SERPL-MCNC: 95 MG/DL (ref 70–99)
HCT VFR BLD AUTO: 28.6 % (ref 40.5–52.5)
HGB BLD-MCNC: 9.1 G/DL (ref 13.5–17.5)
LYMPHOCYTES # BLD: 1.2 K/UL (ref 1–5.1)
LYMPHOCYTES NFR BLD: 11.1 %
MAGNESIUM SERPL-MCNC: 2 MG/DL (ref 1.8–2.4)
MCH RBC QN AUTO: 26.6 PG (ref 26–34)
MCHC RBC AUTO-ENTMCNC: 31.8 G/DL (ref 31–36)
MCV RBC AUTO: 83.7 FL (ref 80–100)
MONOCYTES # BLD: 1 K/UL (ref 0–1.3)
MONOCYTES NFR BLD: 8.9 %
NEUTROPHILS # BLD: 8.7 K/UL (ref 1.7–7.7)
NEUTROPHILS NFR BLD: 79.1 %
PLATELET # BLD AUTO: 403 K/UL (ref 135–450)
PMV BLD AUTO: 7.1 FL (ref 5–10.5)
POTASSIUM SERPL-SCNC: 3.8 MMOL/L (ref 3.5–5.1)
RBC # BLD AUTO: 3.42 M/UL (ref 4.2–5.9)
SODIUM SERPL-SCNC: 137 MMOL/L (ref 136–145)
WBC # BLD AUTO: 11 K/UL (ref 4–11)

## 2023-06-18 PROCEDURE — 99233 SBSQ HOSP IP/OBS HIGH 50: CPT | Performed by: NURSE PRACTITIONER

## 2023-06-18 PROCEDURE — 2580000003 HC RX 258: Performed by: INTERNAL MEDICINE

## 2023-06-18 PROCEDURE — 85025 COMPLETE CBC W/AUTO DIFF WBC: CPT

## 2023-06-18 PROCEDURE — 83735 ASSAY OF MAGNESIUM: CPT

## 2023-06-18 PROCEDURE — 36415 COLL VENOUS BLD VENIPUNCTURE: CPT

## 2023-06-18 PROCEDURE — 6370000000 HC RX 637 (ALT 250 FOR IP): Performed by: INTERNAL MEDICINE

## 2023-06-18 PROCEDURE — 6370000000 HC RX 637 (ALT 250 FOR IP): Performed by: NURSE PRACTITIONER

## 2023-06-18 PROCEDURE — 6360000002 HC RX W HCPCS: Performed by: INTERNAL MEDICINE

## 2023-06-18 PROCEDURE — 80048 BASIC METABOLIC PNL TOTAL CA: CPT

## 2023-06-18 PROCEDURE — 2060000000 HC ICU INTERMEDIATE R&B

## 2023-06-18 PROCEDURE — 2580000003 HC RX 258: Performed by: HOSPITALIST

## 2023-06-18 RX ADMIN — OXYCODONE HYDROCHLORIDE 10 MG: 5 TABLET ORAL at 19:49

## 2023-06-18 RX ADMIN — OXYCODONE HYDROCHLORIDE 5 MG: 5 TABLET ORAL at 14:24

## 2023-06-18 RX ADMIN — OXYCODONE HYDROCHLORIDE 10 MG: 5 TABLET ORAL at 23:37

## 2023-06-18 RX ADMIN — ACETAMINOPHEN 1000 MG: 500 TABLET ORAL at 18:07

## 2023-06-18 RX ADMIN — SODIUM CHLORIDE, POTASSIUM CHLORIDE, SODIUM LACTATE AND CALCIUM CHLORIDE: 600; 310; 30; 20 INJECTION, SOLUTION INTRAVENOUS at 10:42

## 2023-06-18 RX ADMIN — SODIUM CHLORIDE, PRESERVATIVE FREE 10 ML: 5 INJECTION INTRAVENOUS at 19:51

## 2023-06-18 RX ADMIN — Medication 5 MG: at 19:50

## 2023-06-18 RX ADMIN — METOPROLOL TARTRATE 12.5 MG: 25 TABLET, FILM COATED ORAL at 19:50

## 2023-06-18 RX ADMIN — METOPROLOL TARTRATE 12.5 MG: 25 TABLET, FILM COATED ORAL at 11:07

## 2023-06-18 RX ADMIN — ACETAMINOPHEN 1000 MG: 500 TABLET ORAL at 04:02

## 2023-06-18 RX ADMIN — ACETAMINOPHEN 1000 MG: 500 TABLET ORAL at 09:15

## 2023-06-18 RX ADMIN — HYDROMORPHONE HYDROCHLORIDE 0.5 MG: 1 INJECTION, SOLUTION INTRAMUSCULAR; INTRAVENOUS; SUBCUTANEOUS at 04:07

## 2023-06-18 RX ADMIN — SODIUM CHLORIDE, PRESERVATIVE FREE 10 ML: 5 INJECTION INTRAVENOUS at 10:42

## 2023-06-18 ASSESSMENT — PAIN DESCRIPTION - ORIENTATION
ORIENTATION: RIGHT

## 2023-06-18 ASSESSMENT — PAIN DESCRIPTION - ONSET
ONSET: ON-GOING

## 2023-06-18 ASSESSMENT — PAIN SCALES - WONG BAKER
WONGBAKER_NUMERICALRESPONSE: 0
WONGBAKER_NUMERICALRESPONSE: 4
WONGBAKER_NUMERICALRESPONSE: 0

## 2023-06-18 ASSESSMENT — PAIN DESCRIPTION - DESCRIPTORS
DESCRIPTORS: ACHING
DESCRIPTORS: SHARP
DESCRIPTORS: ACHING
DESCRIPTORS: ACHING
DESCRIPTORS: GNAWING
DESCRIPTORS: ACHING;SHOOTING
DESCRIPTORS: SHARP

## 2023-06-18 ASSESSMENT — PAIN DESCRIPTION - FREQUENCY
FREQUENCY: CONTINUOUS

## 2023-06-18 ASSESSMENT — PAIN DESCRIPTION - LOCATION
LOCATION: SHOULDER
LOCATION: BACK
LOCATION: SHOULDER

## 2023-06-18 ASSESSMENT — PAIN SCALES - GENERAL
PAINLEVEL_OUTOF10: 6
PAINLEVEL_OUTOF10: 7
PAINLEVEL_OUTOF10: 7
PAINLEVEL_OUTOF10: 3
PAINLEVEL_OUTOF10: 3
PAINLEVEL_OUTOF10: 1
PAINLEVEL_OUTOF10: 3
PAINLEVEL_OUTOF10: 10

## 2023-06-18 ASSESSMENT — PAIN DESCRIPTION - DIRECTION
RADIATING_TOWARDS: NECK

## 2023-06-18 ASSESSMENT — PAIN DESCRIPTION - PAIN TYPE
TYPE: CHRONIC PAIN

## 2023-06-19 ENCOUNTER — APPOINTMENT (OUTPATIENT)
Dept: VASCULAR LAB | Age: 53
DRG: 180 | End: 2023-06-19
Payer: COMMERCIAL

## 2023-06-19 LAB
ANION GAP SERPL CALCULATED.3IONS-SCNC: 10 MMOL/L (ref 3–16)
BACTERIA FLD AEROBE CULT: NORMAL
BASOPHILS # BLD: 0.1 K/UL (ref 0–0.2)
BASOPHILS NFR BLD: 0.8 %
BUN SERPL-MCNC: 11 MG/DL (ref 7–20)
CALCIUM SERPL-MCNC: 9.3 MG/DL (ref 8.3–10.6)
CHLORIDE SERPL-SCNC: 98 MMOL/L (ref 99–110)
CO2 SERPL-SCNC: 30 MMOL/L (ref 21–32)
CREAT SERPL-MCNC: <0.5 MG/DL (ref 0.9–1.3)
DEPRECATED RDW RBC AUTO: 17.3 % (ref 12.4–15.4)
EKG ATRIAL RATE: 135 BPM
EKG DIAGNOSIS: NORMAL
EKG P-R INTERVAL: 176 MS
EKG Q-T INTERVAL: 274 MS
EKG QRS DURATION: 78 MS
EKG QTC CALCULATION (BAZETT): 438 MS
EKG R AXIS: -88 DEGREES
EKG T AXIS: 59 DEGREES
EKG VENTRICULAR RATE: 154 BPM
EOSINOPHIL # BLD: 0 K/UL (ref 0–0.6)
EOSINOPHIL NFR BLD: 0.4 %
GFR SERPLBLD CREATININE-BSD FMLA CKD-EPI: >60 ML/MIN/{1.73_M2}
GLUCOSE SERPL-MCNC: 125 MG/DL (ref 70–99)
GRAM STN SPEC: NORMAL
HCT VFR BLD AUTO: 30.8 % (ref 40.5–52.5)
HGB BLD-MCNC: 9.6 G/DL (ref 13.5–17.5)
LYMPHOCYTES # BLD: 1.2 K/UL (ref 1–5.1)
LYMPHOCYTES NFR BLD: 10.3 %
MCH RBC QN AUTO: 25.9 PG (ref 26–34)
MCHC RBC AUTO-ENTMCNC: 31.3 G/DL (ref 31–36)
MCV RBC AUTO: 82.8 FL (ref 80–100)
MONOCYTES # BLD: 0.6 K/UL (ref 0–1.3)
MONOCYTES NFR BLD: 5 %
NEUTROPHILS # BLD: 10 K/UL (ref 1.7–7.7)
NEUTROPHILS NFR BLD: 83.5 %
NT-PROBNP SERPL-MCNC: 2309 PG/ML (ref 0–124)
PLATELET # BLD AUTO: 445 K/UL (ref 135–450)
PMV BLD AUTO: 7.2 FL (ref 5–10.5)
POTASSIUM SERPL-SCNC: 4.1 MMOL/L (ref 3.5–5.1)
RBC # BLD AUTO: 3.72 M/UL (ref 4.2–5.9)
SODIUM SERPL-SCNC: 138 MMOL/L (ref 136–145)
WBC # BLD AUTO: 12 K/UL (ref 4–11)

## 2023-06-19 PROCEDURE — 93308 TTE F-UP OR LMTD: CPT

## 2023-06-19 PROCEDURE — 94640 AIRWAY INHALATION TREATMENT: CPT

## 2023-06-19 PROCEDURE — 6370000000 HC RX 637 (ALT 250 FOR IP): Performed by: NURSE PRACTITIONER

## 2023-06-19 PROCEDURE — 93970 EXTREMITY STUDY: CPT

## 2023-06-19 PROCEDURE — 83880 ASSAY OF NATRIURETIC PEPTIDE: CPT

## 2023-06-19 PROCEDURE — 80048 BASIC METABOLIC PNL TOTAL CA: CPT

## 2023-06-19 PROCEDURE — 99233 SBSQ HOSP IP/OBS HIGH 50: CPT | Performed by: NURSE PRACTITIONER

## 2023-06-19 PROCEDURE — 2060000000 HC ICU INTERMEDIATE R&B

## 2023-06-19 PROCEDURE — 6370000000 HC RX 637 (ALT 250 FOR IP): Performed by: INTERNAL MEDICINE

## 2023-06-19 PROCEDURE — 6360000002 HC RX W HCPCS: Performed by: INTERNAL MEDICINE

## 2023-06-19 PROCEDURE — 36415 COLL VENOUS BLD VENIPUNCTURE: CPT

## 2023-06-19 PROCEDURE — 85025 COMPLETE CBC W/AUTO DIFF WBC: CPT

## 2023-06-19 PROCEDURE — 2580000003 HC RX 258: Performed by: INTERNAL MEDICINE

## 2023-06-19 RX ORDER — HYDRALAZINE HYDROCHLORIDE 20 MG/ML
10 INJECTION INTRAMUSCULAR; INTRAVENOUS EVERY 6 HOURS PRN
Status: DISCONTINUED | OUTPATIENT
Start: 2023-06-19 | End: 2023-06-19

## 2023-06-19 RX ORDER — LABETALOL HYDROCHLORIDE 5 MG/ML
10 INJECTION, SOLUTION INTRAVENOUS EVERY 4 HOURS PRN
Status: DISCONTINUED | OUTPATIENT
Start: 2023-06-19 | End: 2023-06-21 | Stop reason: HOSPADM

## 2023-06-19 RX ORDER — IPRATROPIUM BROMIDE AND ALBUTEROL SULFATE 2.5; .5 MG/3ML; MG/3ML
1 SOLUTION RESPIRATORY (INHALATION) EVERY 4 HOURS PRN
Status: DISCONTINUED | OUTPATIENT
Start: 2023-06-19 | End: 2023-06-21 | Stop reason: HOSPADM

## 2023-06-19 RX ADMIN — METOPROLOL TARTRATE 12.5 MG: 25 TABLET, FILM COATED ORAL at 08:01

## 2023-06-19 RX ADMIN — METOPROLOL TARTRATE 12.5 MG: 25 TABLET, FILM COATED ORAL at 20:46

## 2023-06-19 RX ADMIN — ACETAMINOPHEN 1000 MG: 500 TABLET ORAL at 04:26

## 2023-06-19 RX ADMIN — SODIUM CHLORIDE, PRESERVATIVE FREE 10 ML: 5 INJECTION INTRAVENOUS at 08:02

## 2023-06-19 RX ADMIN — OXYCODONE HYDROCHLORIDE 10 MG: 5 TABLET ORAL at 04:27

## 2023-06-19 RX ADMIN — SENNOSIDES AND DOCUSATE SODIUM 1 TABLET: 50; 8.6 TABLET ORAL at 20:46

## 2023-06-19 RX ADMIN — KETOROLAC TROMETHAMINE 30 MG: 30 INJECTION, SOLUTION INTRAMUSCULAR at 15:53

## 2023-06-19 RX ADMIN — Medication 5 MG: at 20:46

## 2023-06-19 RX ADMIN — KETOROLAC TROMETHAMINE 30 MG: 30 INJECTION, SOLUTION INTRAMUSCULAR at 07:52

## 2023-06-19 RX ADMIN — IPRATROPIUM BROMIDE AND ALBUTEROL SULFATE 1 DOSE: .5; 3 SOLUTION RESPIRATORY (INHALATION) at 21:46

## 2023-06-19 RX ADMIN — SODIUM CHLORIDE, PRESERVATIVE FREE 10 ML: 5 INJECTION INTRAVENOUS at 20:46

## 2023-06-19 RX ADMIN — ACETAMINOPHEN 1000 MG: 500 TABLET ORAL at 18:03

## 2023-06-19 RX ADMIN — ACETAMINOPHEN 1000 MG: 500 TABLET ORAL at 11:06

## 2023-06-19 ASSESSMENT — PAIN DESCRIPTION - PAIN TYPE
TYPE: CHRONIC PAIN

## 2023-06-19 ASSESSMENT — PAIN DESCRIPTION - ORIENTATION
ORIENTATION: RIGHT

## 2023-06-19 ASSESSMENT — PAIN SCALES - GENERAL
PAINLEVEL_OUTOF10: 6
PAINLEVEL_OUTOF10: 5
PAINLEVEL_OUTOF10: 7
PAINLEVEL_OUTOF10: 2
PAINLEVEL_OUTOF10: 4
PAINLEVEL_OUTOF10: 3
PAINLEVEL_OUTOF10: 3
PAINLEVEL_OUTOF10: 7

## 2023-06-19 ASSESSMENT — PAIN DESCRIPTION - DIRECTION
RADIATING_TOWARDS: NECK
RADIATING_TOWARDS: NECK & BACK
RADIATING_TOWARDS: NECK/BACK

## 2023-06-19 ASSESSMENT — PAIN DESCRIPTION - FREQUENCY
FREQUENCY: CONTINUOUS

## 2023-06-19 ASSESSMENT — PAIN DESCRIPTION - ONSET
ONSET: ON-GOING

## 2023-06-19 ASSESSMENT — PAIN DESCRIPTION - LOCATION
LOCATION: SHOULDER

## 2023-06-19 ASSESSMENT — PAIN DESCRIPTION - DESCRIPTORS
DESCRIPTORS: SHOOTING

## 2023-06-19 ASSESSMENT — PAIN SCALES - WONG BAKER
WONGBAKER_NUMERICALRESPONSE: 0
WONGBAKER_NUMERICALRESPONSE: 0
WONGBAKER_NUMERICALRESPONSE: 4

## 2023-06-19 NOTE — PLAN OF CARE
Problem: Pain  Goal: Verbalizes/displays adequate comfort level or baseline comfort level  6/19/2023 0327 by Selena Rai RN  Outcome: Progressing     Problem: Safety - Adult  Goal: Free from fall injury  6/19/2023 0327 by Selena Rai RN  Outcome: Progressing     Problem: Discharge Planning  Goal: Discharge to home or other facility with appropriate resources  6/19/2023 0327 by Selena Rai RN  Outcome: Progressing     Problem: Chronic Conditions and Co-morbidities  Goal: Patient's chronic conditions and co-morbidity symptoms are monitored and maintained or improved  6/19/2023 0327 by Selena Rai RN  Outcome: Progressing     Problem: Skin/Tissue Integrity  Goal: Absence of new skin breakdown  Description: 1. Monitor for areas of redness and/or skin breakdown  2. Assess vascular access sites hourly  3. Every 4-6 hours minimum:  Change oxygen saturation probe site  4. Every 4-6 hours:  If on nasal continuous positive airway pressure, respiratory therapy assess nares and determine need for appliance change or resting period.   6/19/2023 0327 by Selena Rai RN  Outcome: Progressing     Problem: ABCDS Injury Assessment  Goal: Absence of physical injury  6/19/2023 0327 by Selena Rai RN  Outcome: Progressing

## 2023-06-19 NOTE — CARE COORDINATION
Chart review completed. SW notes per RN CM note on 6/16, [facility] can take case (see note). Met with pt at bedside. Pt confirmed he will return home when able. He stated that his rolling walker and shower chair was already delivered at bedside by [facility]. He is agreeable to [facility]. SW will follow.      Chitra OSMAN, EILEEN   for [address] [place] [address] ([facility])  Office Phone: [phone]  [facility] Mobile: [phone]

## 2023-06-20 LAB
ANION GAP SERPL CALCULATED.3IONS-SCNC: 10 MMOL/L (ref 3–16)
ANION GAP SERPL CALCULATED.3IONS-SCNC: 8 MMOL/L (ref 3–16)
BACTERIA BLD CULT ORG #2: NORMAL
BACTERIA BLD CULT: NORMAL
BASOPHILS # BLD: 0.1 K/UL (ref 0–0.2)
BASOPHILS NFR BLD: 0.9 %
BUN SERPL-MCNC: 11 MG/DL (ref 7–20)
BUN SERPL-MCNC: 12 MG/DL (ref 7–20)
CALCIUM SERPL-MCNC: 9.2 MG/DL (ref 8.3–10.6)
CALCIUM SERPL-MCNC: 9.2 MG/DL (ref 8.3–10.6)
CHLORIDE SERPL-SCNC: 97 MMOL/L (ref 99–110)
CHLORIDE SERPL-SCNC: 97 MMOL/L (ref 99–110)
CO2 SERPL-SCNC: 29 MMOL/L (ref 21–32)
CO2 SERPL-SCNC: 29 MMOL/L (ref 21–32)
CREAT SERPL-MCNC: <0.5 MG/DL (ref 0.9–1.3)
CREAT SERPL-MCNC: <0.5 MG/DL (ref 0.9–1.3)
DEPRECATED RDW RBC AUTO: 17.3 % (ref 12.4–15.4)
EOSINOPHIL # BLD: 0.2 K/UL (ref 0–0.6)
EOSINOPHIL NFR BLD: 1.6 %
FERRITIN SERPL IA-MCNC: 378.1 NG/ML (ref 30–400)
GFR SERPLBLD CREATININE-BSD FMLA CKD-EPI: >60 ML/MIN/{1.73_M2}
GFR SERPLBLD CREATININE-BSD FMLA CKD-EPI: >60 ML/MIN/{1.73_M2}
GLUCOSE SERPL-MCNC: 135 MG/DL (ref 70–99)
GLUCOSE SERPL-MCNC: 98 MG/DL (ref 70–99)
HCT VFR BLD AUTO: 30.7 % (ref 40.5–52.5)
HGB BLD-MCNC: 9.7 G/DL (ref 13.5–17.5)
IRON SATN MFR SERPL: 22 % (ref 20–50)
IRON SERPL-MCNC: 26 UG/DL (ref 59–158)
LYMPHOCYTES # BLD: 1.8 K/UL (ref 1–5.1)
LYMPHOCYTES NFR BLD: 14.6 %
MCH RBC QN AUTO: 25.7 PG (ref 26–34)
MCHC RBC AUTO-ENTMCNC: 31.4 G/DL (ref 31–36)
MCV RBC AUTO: 81.8 FL (ref 80–100)
MONOCYTES # BLD: 0.7 K/UL (ref 0–1.3)
MONOCYTES NFR BLD: 5.8 %
NEUTROPHILS # BLD: 9.6 K/UL (ref 1.7–7.7)
NEUTROPHILS NFR BLD: 77.1 %
PLATELET # BLD AUTO: 326 K/UL (ref 135–450)
PLATELET # BLD AUTO: ABNORMAL K/UL (ref 135–450)
PLATELET BLD QL SMEAR: ABNORMAL
PMV BLD AUTO: ABNORMAL FL (ref 5–10.5)
POTASSIUM SERPL-SCNC: 3.9 MMOL/L (ref 3.5–5.1)
POTASSIUM SERPL-SCNC: 5.3 MMOL/L (ref 3.5–5.1)
RBC # BLD AUTO: 3.75 M/UL (ref 4.2–5.9)
SLIDE REVIEW: ABNORMAL
SODIUM SERPL-SCNC: 134 MMOL/L (ref 136–145)
SODIUM SERPL-SCNC: 136 MMOL/L (ref 136–145)
TIBC SERPL-MCNC: 120 UG/DL (ref 260–445)
WBC # BLD AUTO: 12.4 K/UL (ref 4–11)

## 2023-06-20 PROCEDURE — 6360000002 HC RX W HCPCS: Performed by: INTERNAL MEDICINE

## 2023-06-20 PROCEDURE — 83540 ASSAY OF IRON: CPT

## 2023-06-20 PROCEDURE — 6370000000 HC RX 637 (ALT 250 FOR IP): Performed by: INTERNAL MEDICINE

## 2023-06-20 PROCEDURE — 2060000000 HC ICU INTERMEDIATE R&B

## 2023-06-20 PROCEDURE — 6370000000 HC RX 637 (ALT 250 FOR IP): Performed by: NURSE PRACTITIONER

## 2023-06-20 PROCEDURE — 82607 VITAMIN B-12: CPT

## 2023-06-20 PROCEDURE — 83550 IRON BINDING TEST: CPT

## 2023-06-20 PROCEDURE — 85025 COMPLETE CBC W/AUTO DIFF WBC: CPT

## 2023-06-20 PROCEDURE — 80048 BASIC METABOLIC PNL TOTAL CA: CPT

## 2023-06-20 PROCEDURE — 82728 ASSAY OF FERRITIN: CPT

## 2023-06-20 PROCEDURE — 82746 ASSAY OF FOLIC ACID SERUM: CPT

## 2023-06-20 PROCEDURE — 36415 COLL VENOUS BLD VENIPUNCTURE: CPT

## 2023-06-20 PROCEDURE — 99232 SBSQ HOSP IP/OBS MODERATE 35: CPT | Performed by: NURSE PRACTITIONER

## 2023-06-20 PROCEDURE — 2580000003 HC RX 258: Performed by: INTERNAL MEDICINE

## 2023-06-20 PROCEDURE — 99233 SBSQ HOSP IP/OBS HIGH 50: CPT | Performed by: NURSE PRACTITIONER

## 2023-06-20 PROCEDURE — 85049 AUTOMATED PLATELET COUNT: CPT

## 2023-06-20 RX ORDER — MORPHINE SULFATE 15 MG/1
15 TABLET, FILM COATED, EXTENDED RELEASE ORAL EVERY 12 HOURS SCHEDULED
Status: DISCONTINUED | OUTPATIENT
Start: 2023-06-20 | End: 2023-06-21 | Stop reason: HOSPADM

## 2023-06-20 RX ADMIN — MORPHINE SULFATE 15 MG: 15 TABLET, FILM COATED, EXTENDED RELEASE ORAL at 20:06

## 2023-06-20 RX ADMIN — ACETAMINOPHEN 1000 MG: 500 TABLET ORAL at 00:45

## 2023-06-20 RX ADMIN — METOPROLOL TARTRATE 12.5 MG: 25 TABLET, FILM COATED ORAL at 20:06

## 2023-06-20 RX ADMIN — KETOROLAC TROMETHAMINE 30 MG: 30 INJECTION, SOLUTION INTRAMUSCULAR at 14:44

## 2023-06-20 RX ADMIN — SODIUM CHLORIDE, PRESERVATIVE FREE 10 ML: 5 INJECTION INTRAVENOUS at 20:09

## 2023-06-20 RX ADMIN — KETOROLAC TROMETHAMINE 30 MG: 30 INJECTION, SOLUTION INTRAMUSCULAR at 07:40

## 2023-06-20 RX ADMIN — OXYCODONE HYDROCHLORIDE 10 MG: 5 TABLET ORAL at 06:51

## 2023-06-20 RX ADMIN — SODIUM CHLORIDE, PRESERVATIVE FREE 10 ML: 5 INJECTION INTRAVENOUS at 09:52

## 2023-06-20 RX ADMIN — Medication 5 MG: at 20:06

## 2023-06-20 RX ADMIN — ACETAMINOPHEN 1000 MG: 500 TABLET ORAL at 09:51

## 2023-06-20 RX ADMIN — METOPROLOL TARTRATE 12.5 MG: 25 TABLET, FILM COATED ORAL at 09:51

## 2023-06-20 RX ADMIN — SENNOSIDES AND DOCUSATE SODIUM 1 TABLET: 50; 8.6 TABLET ORAL at 09:51

## 2023-06-20 RX ADMIN — KETOROLAC TROMETHAMINE 30 MG: 30 INJECTION, SOLUTION INTRAMUSCULAR at 00:45

## 2023-06-20 RX ADMIN — SENNOSIDES AND DOCUSATE SODIUM 1 TABLET: 50; 8.6 TABLET ORAL at 20:06

## 2023-06-20 RX ADMIN — MORPHINE SULFATE 15 MG: 15 TABLET, FILM COATED, EXTENDED RELEASE ORAL at 09:51

## 2023-06-20 RX ADMIN — ACETAMINOPHEN 1000 MG: 500 TABLET ORAL at 18:20

## 2023-06-20 RX ADMIN — KETOROLAC TROMETHAMINE 30 MG: 30 INJECTION, SOLUTION INTRAMUSCULAR at 23:58

## 2023-06-20 ASSESSMENT — PAIN SCALES - GENERAL
PAINLEVEL_OUTOF10: 10
PAINLEVEL_OUTOF10: 5
PAINLEVEL_OUTOF10: 4
PAINLEVEL_OUTOF10: 4
PAINLEVEL_OUTOF10: 3
PAINLEVEL_OUTOF10: 4
PAINLEVEL_OUTOF10: 8
PAINLEVEL_OUTOF10: 7
PAINLEVEL_OUTOF10: 10
PAINLEVEL_OUTOF10: 2
PAINLEVEL_OUTOF10: 9
PAINLEVEL_OUTOF10: 2
PAINLEVEL_OUTOF10: 6

## 2023-06-20 ASSESSMENT — PAIN DESCRIPTION - DESCRIPTORS
DESCRIPTORS: STABBING

## 2023-06-20 ASSESSMENT — PAIN - FUNCTIONAL ASSESSMENT
PAIN_FUNCTIONAL_ASSESSMENT: PREVENTS OR INTERFERES SOME ACTIVE ACTIVITIES AND ADLS

## 2023-06-20 ASSESSMENT — PAIN DESCRIPTION - PAIN TYPE
TYPE: CHRONIC PAIN

## 2023-06-20 ASSESSMENT — PAIN DESCRIPTION - ONSET
ONSET: ON-GOING

## 2023-06-20 ASSESSMENT — PAIN DESCRIPTION - LOCATION
LOCATION: SHOULDER

## 2023-06-20 ASSESSMENT — PAIN SCALES - WONG BAKER
WONGBAKER_NUMERICALRESPONSE: 4
WONGBAKER_NUMERICALRESPONSE: 0
WONGBAKER_NUMERICALRESPONSE: 0

## 2023-06-20 ASSESSMENT — PAIN DESCRIPTION - FREQUENCY
FREQUENCY: CONTINUOUS

## 2023-06-20 ASSESSMENT — PAIN DESCRIPTION - ORIENTATION
ORIENTATION: RIGHT

## 2023-06-20 ASSESSMENT — PAIN DESCRIPTION - DIRECTION
RADIATING_TOWARDS: NECK AND BACK
RADIATING_TOWARDS: NECK & BACK
RADIATING_TOWARDS: NECK AND BACK

## 2023-06-20 NOTE — PLAN OF CARE
Problem: Pain  Goal: Verbalizes/displays adequate comfort level or baseline comfort level  6/19/2023 1533 by Peyton Wu RN  Outcome: Progressing  Pt reported pain this shift. PRN toradol given per MAR. Pt educated on importance of calling for pain meds when in pain. Pt verbalized understanding. Problem: Safety - Adult  Goal: Free from fall injury  6/19/2023 1533 by Peyton Wu RN  Outcome: Progressing  Pt is a High fall risk. See Radha Coral Fall Score and ABCDS Injury Risk assessments. Explained fall risk precautions to pt and family and rationale behind their use to keep the patient safe. Pt bed is in low position, side rails up, call light and belongings are in reach. Fall wristband applied and present on pts wrist.  Bed alarm on. Pt encouraged to call for assistance. Will continue with hourly rounds for PO intake, pain needs, toileting and repositioning as needed. Problem: Discharge Planning  Goal: Discharge to home or other facility with appropriate resources  6/19/2023 1533 by Peyton Wu RN  Outcome: Progressing  Pt aware and in agreement with discharge plan at this time. Problem: ABCDS Injury Assessment  Goal: Absence of physical injury  6/19/2023 1533 by Peyton Wu RN  Outcome: Progressing  No new physical injuries noted.

## 2023-06-20 NOTE — CARE COORDINATION
SW met with pt at bedside. Pt confirmed he plans on returning home alone when discharged. He stated agreement with [facility] care and his DME has already been delivered at bedside by [facility]. Yves Bumpers at [facility] aware of the above with [facility] as she will place [facility] orders and confirm with [facility]. Christiano OSMAN, CANDIDA-S   for [address] [place] [address] ([facility])  Office Phone: [phone]  [facility]: [phone]    Addendum at 11:00am: Received voicemail from Palliative care CNP who states pt is considering home with hospice and will discuss with his sister this evening.  Palliative Care CNP states will follow up with pt tomorrow and make referrals

## 2023-06-20 NOTE — PLAN OF CARE
Problem: Pain  Goal: Verbalizes/displays adequate comfort level or baseline comfort level  Outcome: Progressing     Problem: Safety - Adult  Goal: Free from fall injury  Outcome: Progressing     Problem: Discharge Planning  Goal: Discharge to home or other facility with appropriate resources  Outcome: Progressing     Problem: Chronic Conditions and Co-morbidities  Goal: Patient's chronic conditions and co-morbidity symptoms are monitored and maintained or improved  Outcome: Progressing     Problem: Skin/Tissue Integrity  Goal: Absence of new skin breakdown  Description: 1. Monitor for areas of redness and/or skin breakdown  2. Assess vascular access sites hourly  3. Every 4-6 hours minimum:  Change oxygen saturation probe site  4. Every 4-6 hours:  If on nasal continuous positive airway pressure, respiratory therapy assess nares and determine need for appliance change or resting period.   Outcome: Progressing     Problem: ABCDS Injury Assessment  Goal: Absence of physical injury  Outcome: Progressing

## 2023-06-21 VITALS
DIASTOLIC BLOOD PRESSURE: 103 MMHG | WEIGHT: 129.2 LBS | OXYGEN SATURATION: 95 % | HEIGHT: 75 IN | SYSTOLIC BLOOD PRESSURE: 162 MMHG | HEART RATE: 90 BPM | RESPIRATION RATE: 18 BRPM | BODY MASS INDEX: 16.06 KG/M2 | TEMPERATURE: 98.2 F

## 2023-06-21 LAB
FOLATE SERPL-MCNC: 5.48 NG/ML (ref 4.78–24.2)
VIT B12 SERPL-MCNC: 286 PG/ML (ref 211–911)

## 2023-06-21 PROCEDURE — 6370000000 HC RX 637 (ALT 250 FOR IP): Performed by: NURSE PRACTITIONER

## 2023-06-21 PROCEDURE — 99232 SBSQ HOSP IP/OBS MODERATE 35: CPT | Performed by: NURSE PRACTITIONER

## 2023-06-21 PROCEDURE — 99233 SBSQ HOSP IP/OBS HIGH 50: CPT | Performed by: NURSE PRACTITIONER

## 2023-06-21 PROCEDURE — 6370000000 HC RX 637 (ALT 250 FOR IP): Performed by: INTERNAL MEDICINE

## 2023-06-21 PROCEDURE — 6360000002 HC RX W HCPCS: Performed by: INTERNAL MEDICINE

## 2023-06-21 PROCEDURE — 2580000003 HC RX 258: Performed by: INTERNAL MEDICINE

## 2023-06-21 RX ORDER — MORPHINE SULFATE 15 MG/1
15 TABLET, FILM COATED, EXTENDED RELEASE ORAL EVERY 12 HOURS SCHEDULED
Qty: 10 TABLET | Refills: 0
Start: 2023-06-21 | End: 2023-06-26

## 2023-06-21 RX ORDER — OXYCODONE HYDROCHLORIDE 5 MG/1
5 TABLET ORAL EVERY 8 HOURS PRN
Qty: 9 TABLET | Refills: 0
Start: 2023-06-21 | End: 2023-06-24

## 2023-06-21 RX ADMIN — ACETAMINOPHEN 1000 MG: 500 TABLET ORAL at 09:32

## 2023-06-21 RX ADMIN — SODIUM CHLORIDE, PRESERVATIVE FREE 10 ML: 5 INJECTION INTRAVENOUS at 09:32

## 2023-06-21 RX ADMIN — METOPROLOL TARTRATE 12.5 MG: 25 TABLET, FILM COATED ORAL at 09:32

## 2023-06-21 RX ADMIN — KETOROLAC TROMETHAMINE 30 MG: 30 INJECTION, SOLUTION INTRAMUSCULAR at 06:42

## 2023-06-21 RX ADMIN — OXYCODONE HYDROCHLORIDE 10 MG: 5 TABLET ORAL at 16:09

## 2023-06-21 RX ADMIN — SENNOSIDES AND DOCUSATE SODIUM 1 TABLET: 50; 8.6 TABLET ORAL at 09:32

## 2023-06-21 RX ADMIN — ACETAMINOPHEN 1000 MG: 500 TABLET ORAL at 00:50

## 2023-06-21 RX ADMIN — MORPHINE SULFATE 15 MG: 15 TABLET, FILM COATED, EXTENDED RELEASE ORAL at 09:32

## 2023-06-21 ASSESSMENT — PAIN DESCRIPTION - FREQUENCY
FREQUENCY: CONTINUOUS

## 2023-06-21 ASSESSMENT — PAIN SCALES - GENERAL
PAINLEVEL_OUTOF10: 4
PAINLEVEL_OUTOF10: 10
PAINLEVEL_OUTOF10: 4
PAINLEVEL_OUTOF10: 3
PAINLEVEL_OUTOF10: 8

## 2023-06-21 ASSESSMENT — PAIN DESCRIPTION - PAIN TYPE
TYPE: CHRONIC PAIN

## 2023-06-21 ASSESSMENT — PAIN DESCRIPTION - ORIENTATION
ORIENTATION: RIGHT

## 2023-06-21 ASSESSMENT — PAIN DESCRIPTION - LOCATION
LOCATION: SHOULDER

## 2023-06-21 ASSESSMENT — PAIN DESCRIPTION - ONSET
ONSET: ON-GOING

## 2023-06-21 ASSESSMENT — PAIN DESCRIPTION - DIRECTION
RADIATING_TOWARDS: NECK AND BACK

## 2023-06-21 ASSESSMENT — PAIN DESCRIPTION - DESCRIPTORS
DESCRIPTORS: STABBING

## 2023-06-21 ASSESSMENT — PAIN - FUNCTIONAL ASSESSMENT
PAIN_FUNCTIONAL_ASSESSMENT: PREVENTS OR INTERFERES SOME ACTIVE ACTIVITIES AND ADLS

## 2023-06-21 ASSESSMENT — PAIN SCALES - WONG BAKER
WONGBAKER_NUMERICALRESPONSE: 2
WONGBAKER_NUMERICALRESPONSE: 8
WONGBAKER_NUMERICALRESPONSE: 0
WONGBAKER_NUMERICALRESPONSE: 0

## 2023-06-21 NOTE — DISCHARGE SUMMARY
!Yes            !None      ! +--------+----------+---------------+----------+ ! SCV     ! Yes       ! Yes            ! None      ! +--------+----------+---------------+----------+ ! Axillary! Yes       ! Yes            ! None      ! +--------+----------+---------------+----------+ ! Brachial!Yes       ! Partial        !Acute     ! +--------+----------+---------------+----------+ ! Radial  !Yes       ! Yes            ! None      ! +--------+----------+---------------+----------+ ! Ulnar   ! Yes       ! Yes            ! None      ! +--------+----------+---------------+----------+ ! Basilic ! Yes       ! Yes            ! None      ! +--------+----------+---------------+----------+ ! Cephalic! Yes       ! Yes            ! None      ! +--------+----------+---------------+----------+ Doppler Measurements +--------+----------+------+ ! Location! Signal    !Reflux! +--------+----------+------+ ! IJV     ! Pulsatile ! No    ! +--------+----------+------+ ! SCV     ! Continuous! No    ! +--------+----------+------+ ! Axillary! Continuous! No    ! +--------+----------+------+ ! Brachial!Absent    ! No    ! +--------+----------+------+ Left UE Vein Measurements 2D Measurements +--------+----------+---------------+----------+ ! Location! Visualized! Compressibility! Thrombosis! +--------+----------+---------------+----------+ ! IJV     ! Yes       ! Yes            ! None      ! +--------+----------+---------------+----------+ ! SCV     ! Yes       ! Yes            ! None      ! +--------+----------+---------------+----------+ ! Axillary! Yes       ! Yes            ! None      ! +--------+----------+---------------+----------+ ! Brachial!Yes       ! Yes            ! None      ! +--------+----------+---------------+----------+ ! Radial  !Yes       ! Yes            ! None      ! +--------+----------+---------------+----------+ ! Ulnar   ! Yes       ! Yes            ! None      ! +--------+----------+---------------+----------+ ! Basilic ! Yes       ! Yes            ! None      !

## 2023-06-21 NOTE — PROGRESS NOTES
06/20/23 1412   Encounter Summary   Encounter Overview/Reason  Initial Encounter   Service Provided For: Patient and family together   Referral/Consult From: [address] Family members   Last Encounter  06/20/23  (RW)   Complexity of Encounter Moderate   Begin Time 1358   End Time  1403   Total Time Calculated 5 min   Encounter    Type Initial Screen/Assessment   Assessment/Intervention/Outcome   Assessment Calm;Coping   Intervention Explored/Affirmed feelings, thoughts, concerns   Outcome Comfort;Coping   Plan and Referrals   Plan/Referrals No future visits requested       visit during rounding. Pt and spouse welcomed  visit. Pt was dozing off so  talked with spouse. No spiritual needs or concerns today. Spouse said other family is coming to visit later today.      Alex Valenzuela M.Div., [facility]  
[address]   Cardiology Note   Dr Cammy Morales MD, Myla Argueta RN, FNP APRN CVNP  Date: 6/19/2023  Admit Date: 6/16/2023       Reason for consultation: \"Large pericardial effusion in patient with metastatic non-small cell lung cancer\"   with metastasis CA     CC:metastatic ca     HPI: Tra Bennett is a 45 y.o. male with a past medical history of   HTN  tobacco use newly diagnosed non-small cell carcinoma of the right lung not currently under any treatment but notably has known large right sided hilar and upper lobe mass who presents with generalized body pain, worse on the right side, when admitted he was lethargic hypotensive, and sepsis protocol was initiated   This am his HR 90s sinus / b/p is on high side SV02 100% on 2l 02 n/c   Pericardiocentesis 6/16/2023: 750 cc of dorian-colored fluid. samples are being sent to the lab for analysis. TTE limited 6/19/2023  Limited study to assess for pericardial effusion s/p pericardiocentesis. Left trivial circumferential pericardial effusion    Today VSS weak pale general discomfort  Pericardial effusion drain intact and approx 90 cc out for 24 hours   will leave drain in for now    Started  metoprolol 12.5 mg BID for tachycardia & HTN on 6/18/2023  I do suspect etiology of tachycardia is physiologic with acute illness   Oncology following / pt is [facility]        Patient seen and examined. Clinical notes reviewed. Telemetry reviewed / Pertinent labs, diagnostic, device, and imaging results reviewed as a part of this visit  I spent a total of 50 minutes and greater than 50% of the time was spent counseling with patient  coordinating care regarding her diagnosis, treatments and plan of care. EKG TTE stress test: any LHC/RHC reviewed       Constitutional: Cooperative and in no apparent distress, and appears well nourished  Skin: Warm and pink; no pallor, cyanosis, clubbing, or bruising   HEENT: Symmetric and normocephalic. PERRL, EOM intact.
Consulted for \"non blanchable white patch on coccyx\". Per chart, pt has opted to go to hospice. Recommend foam dressing. Wound Care to sign off.
Hospitalist Progress Note      PCP: JIGNA Gibson CNP    Date of Admission: 6/16/2023  Current Hospital Day: 6    Chief Admission Complaint:   Chief Complaint   Patient presents with    Arm Pain     Pt presents to the ED via EMS due to generalized body pain up the right side of the body, but focused in the right arm. Neck Pain     Pt complains of neck pain on the right side. Assessment/Plan:  Active Hospital Problems    Diagnosis Date Noted    Metastatic non-small cell lung cancer ([facility] [address] [C34.90] 06/16/2023    Diffuse pain [R52] 06/16/2023    Encounter for palliative care [Z51.5] 06/16/2023    Advance care planning [Z71.89] 06/16/2023     Cardiac tamponade large pericardial effusion POA, lethargic and hypotensive on presentation with CT chest showing moderate to large pericardial effusion which appeared to be new, TTE with large circumferential pericardial effusion with tamponade physiology, underwent emergent pericardiocentesis with 750 cc of dorian-colored fluid removed, there is a drain in place continues to drain straw-colored fluid, cardiology closely following, discussed with them***. Continue metoprolol 12.5 mg bid for tachycardia and hypertension. R brachial DVT - partially occluding. No extension to axillary veins so no need for [facility] at this point. Discussed with oncology***  Metastatic non-small cell lung cancer with progression of disease Hem/ onc consulted and following. Palliative care consulted. Seriously considering hospice, has no help at home. Will talk to sister and decide on 6/21. ****  Nictotine dependance, couselled on cessation, nicotine patch if needed  Intractable pain in the right shoulder, x-ray with no evidence of bony lesion continue pain management. Severe protein calorie malnutrition BMI 15.65, nutrition supplement 3 xtimes daily.     DVT Prophylaxis: Patient refusing Lovenox    Recent Labs     06/19/23  0353 06/20/23  0345 06/20/23  0627    see below
Hospitalist Progress Note      PCP: JIGNA Sanchez - CNP    Date of Admission: 6/16/2023  Current Hospital Day: 4    Chief Admission Complaint:     Presenting History:    Krupa Chen is a 45 y.o. male who presents with Metastatic non-small cell lung cancer ([facility] [address]       Assessment/Plan:    Current Principle Problem:    Metastatic non-small cell lung cancer ([facility] [address]    Cardiac tamponade large pericardial effusion with hemodynamic compromise status post pericardiocentesis with 750 cc of dorian-colored fluid removed, there is a drain in place continues to drain straw-colored fluid cardiology consulted and following. Started on metoprolol 12.5 mg 1 p.o. twice daily for tachycardia and hypertension. R brachial DVT - partially occluding. No extension to axillary veins so no need for [facility] at this point. Metastatic non-small cell lung cancer with progression of disease Hem/ onc consulted and following. Palliative care consulted. Tobacco abuse. Intractable pain in the right shoulder, x-ray with no evidence of bony lesion continue pain management. Severe protein calorie malnutrition BMI 15.65, nutrition supplement 3 times daily. CODE STATUS discussed with the patient and changed to DNR CC per his request.  Updated patient's brother Michael Meigs at [phone]     DVT Prophylaxis: Patient refused Lovenox      Recent Labs     06/17/23  0444 06/18/23  0432 06/19/23  0353    [phone]       Diet: ADULT DIET; Regular  ADULT ORAL NUTRITION SUPPLEMENT; Breakfast, Lunch, Dinner; Standard High Calorie/High Protein Oral Supplement  Code Status: DNR-CC      PT/OT Eval Status:     Dispo - Home with family member. Would benefit from hospice services    Subjective: Pt sitting up in bed eating lunch. No complaints currently.        Medications:  Reviewed    Infusion Medications    sodium chloride      lactated ringers IV soln 50 mL/hr at 06/18/23 1042     Scheduled Medications    metoprolol tartrate  12.5 mg
Hospitalist Progress Note      PCP: Shantelle Jack, JIGNA - CNP    Date of Admission: 6/16/2023  Current Hospital Day: 5    Chief Admission Complaint:     Presenting History:    Tra Bennett is a 45 y.o. male who presents with Metastatic non-small cell lung cancer ([facility] [address]       Assessment/Plan:    Current Principle Problem:    Metastatic non-small cell lung cancer ([facility] [address]    Cardiac tamponade large pericardial effusion with hemodynamic compromise status post pericardiocentesis with 750 cc of dorian-colored fluid removed, there is a drain in place continues to drain straw-colored fluid, cardiology consulted and following. Started on metoprolol 12.5 mg 1 p.o. twice daily for tachycardia and hypertension. R brachial DVT - partially occluding. No extension to axillary veins so [facility] at this [facility]. Metastatic non-small cell lung cancer with progression of disease Hem/ onc consulted and following. Palliative care consulted. Seriously considering hospice, has no help at home. Will talk to sister and decide on 6/21. Tobacco abuse. Intractable pain in the right shoulder, x-ray with no evidence of bony lesion continue pain management. Severe protein calorie malnutrition BMI 15.65, nutrition supplement 3 times daily. CODE STATUS discussed with the patient and changed to DNR CC per his request.  Updated patient's brother Lori Mejias at [phone]     DVT Prophylaxis: Patient refused Lovenox      Recent Labs     06/19/23  0353 06/20/23  0345 06/20/23  0627    see below 326       Diet: ADULT DIET; Regular  ADULT ORAL NUTRITION SUPPLEMENT; Breakfast, Lunch, Dinner; Standard High Calorie/High Protein Oral Supplement  Code Status: DNR-CC      PT/OT Eval Status:     Dispo - Home with family member. Would benefit from hospice services    Subjective: Pt sitting up in bed eating lunch. No complaints currently.        Medications:  Reviewed    Infusion Medications    sodium chloride      lactated
[facility]   Cardiology Note   Dr Kisha Stephenson MD, Lorin Dancer RN, FNP APRN CVNP  Date: 6/20/2023  Admit Date: 6/16/2023       Reason for consultation: \"Large pericardial effusion in patient with metastatic non-small cell lung cancer\"   with metastasis CA     CC:\"generalized body pain up the right side of the body, but focused in the right arm. ) and Neck Pain (Pt complains of neck pain on the right side. )\"    Primary cardiologist:  new pt of Dr. Collette Poe     HPI: Jhoana Bingham is a 45 y.o. male with a past medical history of   HTN  tobacco use newly diagnosed non-small cell carcinoma of the right lung not currently under any treatment but notably has known large right sided hilar and upper lobe mass who presents with generalized body pain, worse on the right side, when admitted he was lethargic hypotensive, and sepsis protocol was initiated   This am his HR 90s sinus / b/p is on high side SV02 100% on 2l 02 n/c   Pericardiocentesis 6/16/2023: 750 cc of dorian-colored fluid. samples are being sent to the lab for analysis. Today VSS on RA c/o general soreness SV02 94% HR 83 sinus      Pericardial effusion drain intact and approx  95 cc drained in last 24 hours & will leave drain in for now   Start metoprolol 12.5 mg BID for tachycardia & HTN   I do suspect etiology of tachycardia is physiologic with acute illness   Right arm larger than left  mainly at the elbow / ordered doppler of right upper extremity   TTE limited in am for pericardial effusion assessment    Oncology following    6/19/2023 Limited study to assess for pericardial effusion s/p pericardiocentesis    CT chest 6/16/2023    1. Findings reflect progressive disease with interval increase in size of the dominant right upper lobe lesion and new left lower lobe nodule. Interval increase in size of mediastinal lymph nodes also noted. Findings are also concerning for metastatic   disease of the liver. 2. No pulmonary embolus.   3.
Nutrition Note    Patient's chart reviewed today and RD will hold nutrition follow-up d/t change in status. Nutrition comfort care to be provided unless aggressive tx is desired. No additional nutrition intervention will be initiated at this time. Consult dietitian if additional nutrition intervention desired.       Sarah Kim, [address] [place] [address]   Contact Number: [phone]
ONCOLOGY HEMATOLOGY CARE PROGRESS NOTE      SUBJECTIVE:    He complains of generalized pain that is not well controlled. He tells me that he is \"miserable. \" Feels the Oxycodone is not managing his symptoms totally although he admits to some improvement when he receives the medication. He complains of widespread joint pains but states his RUE is his biggest complaint. He feels that he does not have the support he needs at home. He has questions about this today. ROS:     Constitutional: No fever, No chills, No night sweats.     Eyes:  No impairment or change in vision  ENT / Mouth:  No pain, abnormal ulceration, bleeding, nasal drip or change in voice or hearing  Cardiovascular:  No chest pain, palpitations, new edema, or calf discomfort  Respiratory:  No pain, hemoptysis, change to breathing  Breast:  No pain, discharge, change in appearance or texture  Gastrointestinal:  No pain, cramping, jaundice, change to eating and bowel habits  Urinary:  No pain, bleeding or change in continence  Genitalia: No pain, bleeding or discharge  Musculoskeletal:  No redness, +pain, edema or weakness  Skin:  No pruritus, rash, change to nodules or lesions  Neurologic:  No discomfort, change in mental status, speech, sensory or motor activity  Psychiatric:  No change in concentration or change to affect or mood  Endocrine:  No hot flashes, increased thirst, or change to urine production  Hematologic: No petechiae, ecchymosis or bleeding  Lymphatic:  No lymphadenopathy or lymphedema  Allergy / Immunologic:  No eczema, hives, frequent or recurrent infections    OBJECTIVE        Physical    VITALS:  Patient Vitals for the past 24 hrs:   BP Temp Temp src Pulse Resp SpO2   06/20/23 0330 (!) 158/105 98.2 °F (36.8 °C) Oral 98 23 93 %   06/20/23 0110 -- -- -- -- 23 --   06/20/23 0041 (!) 150/101 -- -- -- -- --   06/20/23 0040 (!) 150/101 97.8 °F (36.6 °C) Oral 93 28 94 %   06/19/23 2146 -- -- -- --
Palliative Medicine Progress Note    Admit Date: 6/16/2023  Hospital Day:  Hospital Day: 6     CC: arm pain  HPI: Bernadette Seo is a 45 y.o. male with PMH of previous hypertension, newly diagnosed non-small cell carcinoma of the right lung not currently under any treatment who presented with generalized body pain, worse on the right side. He has been taking his prescribed oxycodone without relief. He specifically endorses pain in his right hand which he reports has been swollen to the point of not being able to use it for fine motor functions including feeding himself for the past 1 month. Of note per chart review it appears pt has had difficulty following up with specialists due to logistics and lack of insurance. He has now been approved for medicaid, saw oncology outpatient 6/6. Met with pt at the bedside. He reports he has spoken with his sister about hospice and she is supportive. He requests a hospice referral today, he prefers whichever agency has the closest [facility] to him (pt plans to go home with hospice at this time but would like the option of IPU if needed for symptom management). Will make referral to [facility] as that is closest to pt's home. Recommendations:     1. Goals of Care/Advanced Care planning/Code status: [facility], pt's goals are comfort directed. Plan for hospice referral today. 2. Pain likely 2/2 metastatic NSCLC: Pt endorses right shoulder pain is controlled today - but seems to be worse at night. pt was started on ER morphine 15mg BID this admission- Oxycodone 5-10mg q4h prn for breakthrough pain. Pt has only had one dose of oxycodone in last 24h. Advised pt to ask for breakthrough pain medicine as needed. Can increase ER morphine to 30mg BID if that is not effective. Oncology consulted for NSCLC, appreciate input today. 3. SOB: pt denies  4. Disposition: Pt would like to return home with hospice.  Referral to [facility] hospice today    Subjective:     Scheduled Meds:   morphine  15 mg
Physical Therapy and Occupational Therapy  Discharge Note    Chart reviewed. Noted plan is now for hospice. Will sign off from PT/OT services. RN is aware.     Megan Tsai #[id number]   Dilia Adorno  MS, OTR/L #[id number]
Physical Therapy and Occupational Therapy  No Treatment    Attempted to see pt for PT/OT this afternoon. Pt refusing therapy stating, \"Not today. I am whooped. Maybe tomorrow. \"  RN made aware. Noted Palliative Care consult and possible hospice? Will try back per PT/OT plans of care as appropriate.     Cheri Yang, [place] #[id number]  Angelia Guy  MS, OTR/L #[id number]
Physician Progress Note      PATIENT:               Tereza Palm  CSN #:                  [id number]  :                       1970  ADMIT DATE:       2023 1:37 AM  DISCH DATE:  Ryan Worley  PROVIDER #:        Prateek Shen MD          QUERY TEXT:    Patient admitted  with Pericardial effusion. Noted documentation of sepsis   in Cardiology c/s note. No infection documented or treated. In order to   support the diagnosis of sepsis, please include additional clinical indicators   in your documentation. Or please document if the diagnosis of sepsis has   been ruled out after further study    The medical record reflects the following:  Risk Factors: Lung & liver CA  Clinical Indicators: SIRS on admit x 1: , with LA: 2.4. SIRS day 2: WBC:   15, HR: 117. Per Cards NP -  \"Sepsis/hypotensive\". Treatment: Cefe/ Vanc x 1 on admit, no further ABX, 1L LR bolus & 30ml/kg LR   bolus on admit, IVF @ 50/hr, Bld cx & (Neg), Pericardiocentesis w/ cx (Neg),   CTA chest  Options provided:  -- SIRS non-infectious due to pericardial effusion, Sepsis was ruled out after   study  -- Sepsis present as evidenced by, Please document evidence. -- Other - I will add my own diagnosis  -- Disagree - Not applicable / Not valid  -- Disagree - Clinically unable to determine / Unknown  -- Refer to Clinical Documentation Reviewer    PROVIDER RESPONSE TEXT:    SIRS non-infectious due to pericardial effusion, Sepsis was ruled out after   study. Query created by:  Gómez Doe on 2023 6:07 AM      Electronically signed by:  Prateek Shen MD 2023 8:44 AM
RN messaged Dr. Lloyd Mojica at [facility], pt had his venous duplex done and it shows a right partially occlusive brachial DVT. Please advise. Thanks. \"    MD responded \"Not working. \"    Foster Calvo at [facility], I am trying to message Dr. Leslie Weller but perfect serve won't let me. Pt had his venous duplex done and it shows a right partially occlusive brachial DVT. Please advise. Thanks. \"    MD told RN he would forward message to Dr. Leslie Chauhanch. RN also messaged Cardiology On-Call at [facility], pt had his venous duplex done and it shows a right partially occlusive brachial DVT. Hospitalist also notified. Thanks. \" No response at this time.
Sister, Oz Mccollum, called to check on pt and mentioned pt having a hard time eating due to dental issues. Sister was updated. Sister will be by tomorrow at the end of the day to visit.  Sister is on pt's contact information, pt privacy was
This patient's most recent set of VS were 160/103 and previously was 152/103, RR 26, HR 97, SpO2 was 88 on RA, I did put him on 0.5 L/m and it ford to 95%. He is here for pericardial effusion, he has a chest drain to gravity currently draining. He has an echo in the AM. He is also having increased pain from last night in his shoulder. He is getting a doppler tomorrow. I was unsure if there was anything that needs to be done about his BP. He does not currently have any PRN BP medications. thank you.
[facility]   Cardiology Note   Dr Efraín Lang MD, Paula Watson RN, FNP APRN CVNP  Date: 6/21/2023  Admit Date: 6/16/2023       Reason for consultation: \"Large pericardial effusion in patient with metastatic non-small cell lung cancer\"   with metastasis CA     CC:\"generalized body pain up the right side of the body, but focused in the right arm. ) and Neck Pain (Pt complains of neck pain on the right side. )\"    Primary cardiologist:  new pt of Dr. Niya Thurston     HPI: Silvia  is a 45 y.o. male with a past medical history of   HTN  tobacco use newly diagnosed non-small cell carcinoma of the right lung not currently under any treatment but notably has known large right sided hilar and upper lobe mass who presents with generalized body pain, worse on the right side, when admitted he was lethargic hypotensive, and sepsis protocol was initiated   This am his HR 90s sinus / b/p is on high side SV02 100% on 2l 02 n/c   Pericardiocentesis 6/16/2023: 750 cc of dorian-colored fluid. samples are being sent to the lab for analysis. Today up in chair VSS on RA  SV02 96% HR 84 sinus      Pericardial effusion drain intact and approx 20 cc drained per I &0  in epic   Started metoprolol 12.5 mg BID for tachycardia & HTN   I do suspect etiology of tachycardia is physiologic with acute illness   Oncology following  6/19/2023 Limited study to assess for pericardial effusion s/p pericardiocentesis. Left trivial circumferential pericardial effusion. Plan home  hospice / [facility]     CT chest 6/16/2023    1. Findings reflect progressive disease with interval increase in size of the dominant right upper lobe lesion and new left lower lobe nodule. Interval increase in size of mediastinal lymph nodes also noted. Findings are also concerning for metastatic   disease of the liver. 2. No pulmonary embolus.   3. Moderate to large pericardial effusion, new in the interval.    6/16/2023 TTE   Left ventricular cavity size
Pulmonary:      Breath sounds: Normal breath sounds. Abdominal:      General: Bowel sounds are normal.      Palpations: Abdomen is soft. Musculoskeletal:      Right lower leg: No edema. Left lower leg: No edema. Skin:     General: Skin is warm and dry. Neurological:      Mental Status: He is alert and oriented to person, place, and time. WBC/Hgb/Hct/Plts:  --/--/--/326 (06/20 1790)           Assessment:     Principal Problem:    Metastatic non-small cell lung cancer ([facility] [address]  Active Problems:    Diffuse pain    Encounter for palliative care    Advance care planning  Resolved Problems:    * No resolved hospital problems. *      Time spent with patient and/or family: 27  Time reviewing records: 5  Time communicating with providers: 10    A total of 45 minutes spent with the patient and family on unit greater than 50% face to face time in counseling regarding palliative care and goals of care for the patient.      [address] [facility]  Inpatient Palliative Care  [phone]
lung reveals emphysematous changes secondary to chronic smoking. 4.  Intractable pain in the right shoulder radiating down the entire upper extremity. -Reviewed the x-rays done in the current admission.  -No evidence of bony lesions  - started Morphine ER 15 mg BID 6/20/23  - continue Oxycodone   - consider increasing his MS04 prior to discharge as his symptoms are not well controlled      5. R brachial DVT - partially occluding.   - No extension to axillary veins so no need for [facility] at this point.       ONCOLOGIC DISPOSITION:  Home with hospice     JIGNA Holguin - CNP  Please contact through [address]

## 2023-06-21 NOTE — PLAN OF CARE
Problem: Pain  Goal: Verbalizes/displays adequate comfort level or baseline comfort level  6/21/2023 1612 by Leslie Cardozo RN  Outcome: Progressing  Problem: Safety - Adult  Goal: Free from fall injury  6/21/2023 1612 by Leslie Cardozo RN  Outcome: Progressing     Problem: Discharge Planning  Goal: Discharge to home or other facility with appropriate resources  6/21/2023 1612 by Leslie Cardozo RN  Outcome: Progressing     Problem: Chronic Conditions and Co-morbidities  Goal: Patient's chronic conditions and co-morbidity symptoms are monitored and maintained or improved  6/21/2023 1612 by Leslie Cardozo RN  Outcome: Progressing     Problem: Skin/Tissue Integrity  Goal: Absence of new skin breakdown  Description: 1. Monitor for areas of redness and/or skin breakdown  2. Assess vascular access sites hourly  3. Every 4-6 hours minimum:  Change oxygen saturation probe site  4. Every 4-6 hours:  If on nasal continuous positive airway pressure, respiratory therapy assess nares and determine need for appliance change or resting period.   6/21/2023 1612 by Leslie Cardozo RN  Outcome: Progressing     Problem: ABCDS Injury Assessment  Goal: Absence of physical injury  6/21/2023 1612 by Leslie Cardozo RN  Outcome: Progressing

## 2023-06-21 NOTE — CARE COORDINATION
ANNA met with pt at bedside. Pt stated he is going home and believes he decided on hospice. He is aware Palliative Care CNP will be updated. Message left for [facility] JIGNA-CNP with Palliative Care on the above. Awaiting determination on hospice. Janes Given EILEEN OSMAN   for [address] [facility] ([address])  Office Phone: [phone]  [address]: [phone]    Addendum at 10:24am: Received notification from [facility] APRN-CNP that pt wants referral to [facility] hospice to return home and they can come anytime to meet with pt. Referral called to [facility] at [facility] with the request for them to meet with pt today. [facility] states they will come see pt between 3pm-330pm today. Jitendra Giron RN aware and will update pt    Addendum at 1:18pm: Received call from [facility] who states she met with pt and pt will discharge to their inpatient unit this evening. Oz stated RN is reaching out to MD regarding the drain being removed. Oz stated that she needs the DNR signed from MD and will arrange transportation to their inpatient unit.      Perfect serve sent to MD regarding the above and states will sign the DNR around 3pm.              Case Management Assessment            Discharge Note                    Date / Time of Note: 6/21/2023 1:24 PM                  Discharge Note Completed by: EILEEN Wolf   for [address] [facility] [place])  Office Phone: [phone]  [address] [place] [address] Mobile: [phone]    Patient Name: Jared Miguel   YOB: 1970  Diagnosis: Severe muscle deconditioning [R29.898]  Diffuse pain [R52]  Malignant pericardial effusion [I31.31]  Metastatic non-small cell lung cancer ([address] [C34.90]  NSCLC metastatic to liver ([address] [C34.90, C78.7]   Date / Time: 6/16/2023  1:37 AM    Current PCP: JIGNA Avitia CNP  Clinic patient: No    Hospitalization in the last 30 days: No       Advance Directives:  Code

## 2023-06-21 NOTE — PROGRESS NOTES
Discharge instructions reviewed with patient. Copies signed and given to patient along with any discharge prescriptions. IV left in place, saline locked and alcohol cap applied. Site WNL. To discharge via stretcher/transportation services.

## 2023-06-27 LAB
ACID FAST STN SPEC QL: NORMAL
MYCOBACTERIUM SPEC CULT: NORMAL

## 2023-07-04 LAB
ACID FAST STN SPEC QL: NORMAL
MYCOBACTERIUM SPEC CULT: NORMAL

## 2023-07-11 LAB
ACID FAST STN SPEC QL: NORMAL
MYCOBACTERIUM SPEC CULT: NORMAL

## 2023-07-12 ENCOUNTER — HOSPITAL ENCOUNTER (EMERGENCY)
Age: 53
Discharge: HOME | End: 2023-07-13
Attending: STUDENT IN AN ORGANIZED HEALTH CARE EDUCATION/TRAINING PROGRAM
Payer: COMMERCIAL

## 2023-07-12 DIAGNOSIS — Z51.5 ENCOUNTER FOR PALLIATIVE CARE: ICD-10-CM

## 2023-07-12 DIAGNOSIS — M25.511 CHRONIC RIGHT SHOULDER PAIN: Primary | ICD-10-CM

## 2023-07-12 DIAGNOSIS — G89.29 CHRONIC RIGHT SHOULDER PAIN: Primary | ICD-10-CM

## 2023-07-12 PROCEDURE — 99283 EMERGENCY DEPT VISIT LOW MDM: CPT

## 2023-07-12 RX ORDER — OXYCODONE HYDROCHLORIDE AND ACETAMINOPHEN 5; 325 MG/1; MG/1
2 TABLET ORAL
Status: COMPLETED | OUTPATIENT
Start: 2023-07-13 | End: 2023-07-13

## 2023-07-13 ENCOUNTER — APPOINTMENT (OUTPATIENT)
Dept: GENERAL RADIOLOGY | Age: 53
End: 2023-07-13
Payer: COMMERCIAL

## 2023-07-13 ENCOUNTER — APPOINTMENT (OUTPATIENT)
Dept: CT IMAGING | Age: 53
End: 2023-07-13
Payer: COMMERCIAL

## 2023-07-13 VITALS
HEIGHT: 75 IN | TEMPERATURE: 98.3 F | SYSTOLIC BLOOD PRESSURE: 116 MMHG | WEIGHT: 121.9 LBS | OXYGEN SATURATION: 93 % | RESPIRATION RATE: 18 BRPM | HEART RATE: 102 BPM | DIASTOLIC BLOOD PRESSURE: 83 MMHG | BODY MASS INDEX: 15.16 KG/M2

## 2023-07-13 PROCEDURE — 73030 X-RAY EXAM OF SHOULDER: CPT

## 2023-07-13 PROCEDURE — 6370000000 HC RX 637 (ALT 250 FOR IP): Performed by: STUDENT IN AN ORGANIZED HEALTH CARE EDUCATION/TRAINING PROGRAM

## 2023-07-13 RX ORDER — SODIUM CHLORIDE, SODIUM LACTATE, POTASSIUM CHLORIDE, AND CALCIUM CHLORIDE .6; .31; .03; .02 G/100ML; G/100ML; G/100ML; G/100ML
1000 INJECTION, SOLUTION INTRAVENOUS ONCE
Status: DISCONTINUED | OUTPATIENT
Start: 2023-07-13 | End: 2023-07-13

## 2023-07-13 RX ORDER — OXYCODONE HYDROCHLORIDE AND ACETAMINOPHEN 5; 325 MG/1; MG/1
1 TABLET ORAL EVERY 6 HOURS PRN
Qty: 12 TABLET | Refills: 0 | Status: SHIPPED | OUTPATIENT
Start: 2023-07-13 | End: 2023-07-16

## 2023-07-13 RX ADMIN — OXYCODONE HYDROCHLORIDE AND ACETAMINOPHEN 2 TABLET: 5; 325 TABLET ORAL at 00:33

## 2023-07-13 ASSESSMENT — ENCOUNTER SYMPTOMS
SHORTNESS OF BREATH: 0
NAUSEA: 0
VOMITING: 0
CHEST TIGHTNESS: 0

## 2023-07-13 ASSESSMENT — PAIN SCALES - GENERAL
PAINLEVEL_OUTOF10: 0
PAINLEVEL_OUTOF10: 10

## 2023-07-13 ASSESSMENT — PAIN DESCRIPTION - DESCRIPTORS: DESCRIPTORS: SHOOTING;THROBBING

## 2023-07-13 ASSESSMENT — PAIN DESCRIPTION - ORIENTATION: ORIENTATION: RIGHT

## 2023-07-13 ASSESSMENT — PAIN DESCRIPTION - LOCATION: LOCATION: SHOULDER

## 2023-07-13 ASSESSMENT — PAIN - FUNCTIONAL ASSESSMENT: PAIN_FUNCTIONAL_ASSESSMENT: NONE - DENIES PAIN

## 2023-07-13 NOTE — DISCHARGE INSTRUCTIONS
The pain in your shoulder and the swelling is likely from spreading of your cancer. As we discussed, there is a possibility that this could be from an infectious or inflammatory process. We had a long conversation about obtaining a CT scan to further evaluate, however given you are in hospice care and want to focus on comfort, we decided it was best to focus on these goals and avoid aggressive therapies. Please follow-up with your cancer doctors and hospice doctors for continued pain management. Return to the emergency room if you develop any new or worsening symptoms.

## 2023-07-14 PROBLEM — Z51.5 HOSPICE CARE PATIENT: Status: ACTIVE | Noted: 2023-07-14

## 2023-07-18 LAB
ACID FAST STN SPEC QL: NORMAL
MYCOBACTERIUM SPEC CULT: NORMAL

## 2023-07-25 LAB
ACID FAST STN SPEC QL: NORMAL
MYCOBACTERIUM SPEC CULT: NORMAL

## 2023-08-01 LAB
ACID FAST STN SPEC QL: NORMAL
MYCOBACTERIUM SPEC CULT: NORMAL

## 2023-08-08 ENCOUNTER — TELEPHONE (OUTPATIENT)
Dept: PRIMARY CARE CLINIC | Age: 53
End: 2023-08-08

## 2023-08-08 NOTE — TELEPHONE ENCOUNTER
Sathish Kimble from [facility] calling to see if pt's death certicate can be signed.     She is aware this is Renata's pt but says she needs a doctor to sign    She is aware there are no providers here this afternoon and to please let her know tomorrow

## 2023-08-09 NOTE — TELEPHONE ENCOUNTER
Patients death certificate faxed over and walker  home was called to confirm getting fax left a voicemail waiting for call back

## (undated) DEVICE — NEEDLE ASPIR 19GA HISTOLOGY FLX VIZISHOT 2

## (undated) DEVICE — FORCEPS BX L100CM DIA1.8MM WRK CHN 2MM PULM S STL RAD JAW 4

## (undated) DEVICE — Z DISCONTINUED USE 2749457 TUBING SAMP AD W12.5XH8.4IN D9.1IN NSL ORAL SMRT CAPNOLINE

## (undated) DEVICE — MINI-FORCEPS: Brand: COREDX™